# Patient Record
Sex: FEMALE | Race: BLACK OR AFRICAN AMERICAN | NOT HISPANIC OR LATINO | Employment: STUDENT | URBAN - METROPOLITAN AREA
[De-identification: names, ages, dates, MRNs, and addresses within clinical notes are randomized per-mention and may not be internally consistent; named-entity substitution may affect disease eponyms.]

---
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
## 2021-03-10 ENCOUNTER — OFFICE VISIT (OUTPATIENT)
Dept: INTERNAL MEDICINE | Facility: CLINIC | Age: 21
End: 2021-03-10
Payer: COMMERCIAL

## 2021-03-10 VITALS
DIASTOLIC BLOOD PRESSURE: 82 MMHG | WEIGHT: 176.6 LBS | TEMPERATURE: 98 F | SYSTOLIC BLOOD PRESSURE: 116 MMHG | RESPIRATION RATE: 16 BRPM | BODY MASS INDEX: 29.42 KG/M2 | HEIGHT: 65 IN | OXYGEN SATURATION: 100 % | HEART RATE: 77 BPM

## 2021-03-10 DIAGNOSIS — E55.9 VITAMIN D DEFICIENCY: Primary | ICD-10-CM

## 2021-03-10 PROCEDURE — 36415 COLL VENOUS BLD VENIPUNCTURE: CPT | Performed by: NURSE PRACTITIONER

## 2021-03-10 PROCEDURE — 99213 OFFICE O/P EST LOW 20 MIN: CPT | Performed by: NURSE PRACTITIONER

## 2021-03-10 PROCEDURE — 82306 VITAMIN D 25 HYDROXY: CPT | Performed by: NURSE PRACTITIONER

## 2021-03-10 ASSESSMENT — ENCOUNTER SYMPTOMS
WEAKNESS: 0
BREAST MASS: 0
DIARRHEA: 0
JOINT SWELLING: 0
CONSTIPATION: 0
FREQUENCY: 0
EYE PAIN: 0
HEMATURIA: 0
COUGH: 0
NERVOUS/ANXIOUS: 0
PALPITATIONS: 0
HEADACHES: 0
ARTHRALGIAS: 0
SORE THROAT: 0
FEVER: 0
DIZZINESS: 0
PARESTHESIAS: 0
HEARTBURN: 0
DYSURIA: 0
HEMATOCHEZIA: 0
SHORTNESS OF BREATH: 0
NAUSEA: 0
MYALGIAS: 0
ABDOMINAL PAIN: 0

## 2021-03-10 ASSESSMENT — PAIN SCALES - GENERAL: PAINLEVEL: NO PAIN (0)

## 2021-03-10 ASSESSMENT — MIFFLIN-ST. JEOR: SCORE: 1576.93

## 2021-03-10 NOTE — LETTER
March 15, 2021      [facility] TERRANCE Naranjo  [address]   [place] [id number]        Dear ,    We are writing to inform you of your test results.    Your recent lab results were normal.  Please continue to take Vitamin D 8303-0154 U daily.     Resulted Orders   Vitamin D Deficiency   Result Value Ref Range    Vitamin D Deficiency screening 28 20 - 75 ug/L      Comment:      Season, race, dietary intake, and treatment affect the concentration of   25-hydroxy-Vitamin D. Values may decrease during winter months and increase   during summer months. Values 20-29 ug/L may indicate Vitamin D insufficiency   and values <20 ug/L may indicate Vitamin D deficiency.  Vitamin D determination is routinely performed by an immunoassay specific for   25 hydroxyvitamin D3.  If an individual is on vitamin D2 (ergocalciferol)   supplementation, please specify 25 OH vitamin D2 and D3 level determination by   LCMSMS test [id number].         If you have any questions or concerns, please call the clinic at the number listed above.       Sincerely,      Ela Mendoza NP

## 2021-03-10 NOTE — PROGRESS NOTES
SUBJECTIVE:   CC: Gely Naranjo is an 20 year old woman who presents for preventive health visit.       Patient has been advised of split billing requirements and indicates understanding: Yes  Healthy Habits:     Getting at least 3 servings of Calcium per day:  NO    Bi-annual eye exam:  Yes    Dental care twice a year:  Yes    Sleep apnea or symptoms of sleep apnea:  Excessive snoring    Diet:  Regular (no restrictions)    Frequency of exercise:  1 day/week    Duration of exercise:  15-30 minutes    Taking medications regularly:  Yes    Medication side effects:  None    PHQ-2 Total Score: 0    Additional concerns today:  No          H/o Vitamin D def, taking 2000 U daily    Today's PHQ-2 Score:   PHQ-2 ( 1999 Pf[employer]) 3/10/2021   Q1: Little interest or pleasure in doing things 0   Q2: Feeling down, depressed or hopeless 0   PHQ-2 Score 0   Q1: Little interest or pleasure in doing things Not at all   Q2: Feeling down, depressed or hopeless Not at all   PHQ-2 Score 0       Abuse: Current or Past (Physical, Sexual or Emotional) - No  Do you feel safe in your environment? Yes    Have you ever done Advance Care Planning? (For example, a Health Directive, POLST, or a discussion with a medical provider or your loved ones about your wishes): No, advance care planning information given to patient to review.  Patient declined advance care planning discussion at this time.    Social History     Tobacco Use     Smoking status: Never Smoker     Smokeless tobacco: Never Used   Substance Use Topics     Alcohol use: No     If you drink alcohol do you typically have >3 drinks per day or >7 drinks per week? No    Alcohol Use 3/10/2021   Prescreen: >3 drinks/day or >7 drinks/week? Not Applicable   Prescreen: >3 drinks/day or >7 drinks/week? -         Reviewed orders with patient.  Reviewed health maintenance and updated orders accordingly - Yes  BP Readings from Last 3 Encounters:   03/10/21 116/82   12/01/20 119/81   08/27/20 103/72     Wt Readings from Last 3 Encounters:   03/10/21 80.1 kg (176 lb 9.6 oz) (94 %, Z= 1.55)*   12/01/20 86.4 kg (190 lb 6.4 oz) (96 %, Z= 1.81)*   08/27/20 85 kg (187 lb 6.4 oz) (96 %, Z= 1.77)*     * Growth percentiles are based on [facility] (Girls, 2-20 Years) data.                  Patient Active Problem List   Diagnosis     Vitamin D deficiency     History reviewed. No pertinent surgical history.    Social History     Tobacco Use     Smoking status: Never Smoker     Smokeless tobacco: Never Used   Substance Use Topics     Alcohol use: No     Family History   Problem Relation Age of Onset     Diabetes Maternal Grandmother      No Known Problems Mother      No Known Problems Father      Coronary Artery Disease No family hx of          Current Outpatient Medications   Medication Sig Dispense Refill     calcium carbonate-vitamin D 500-400 MG-UNIT TABS per tablet Take 1 tablet by mouth daily 180 tablet 3     dicyclomine (BENTYL) 10 MG capsule Take 1 capsule (10 mg) by mouth 4 times daily as needed 120 capsule 0     omeprazole (PRILOSEC) 20 MG DR capsule Take 1 capsule (20 mg) by mouth daily 90 capsule 0           History of abnormal Pap smear: NO - under age 21, PAP not appropriate for age     Reviewed and updated as needed this visit by clinical staff  Tobacco  Allergies  Meds   Med Hx  Surg Hx  Fam Hx  Soc Hx        Reviewed and updated as needed this visit by Provider                    Review of Systems   Constitutional: Negative for fever.   HENT: Positive for congestion. Negative for ear pain, hearing loss and sore throat.    Eyes: Negative for pain and visual disturbance.   Respiratory: Negative for cough and shortness of breath.    Cardiovascular: Negative for chest pain, palpitations and peripheral edema.   Gastrointestinal: Negative for abdominal pain, constipation, diarrhea, heartburn, hematochezia and nausea.   Breasts:  Negative for tenderness, breast mass and discharge.   Genitourinary: Negative for  "dysuria, frequency, genital sores, hematuria, pelvic pain, urgency, vaginal bleeding and vaginal discharge.   Musculoskeletal: Negative for arthralgias, joint swelling and myalgias.   Skin: Negative for rash.   Neurological: Negative for dizziness, weakness, headaches and paresthesias.   Psychiatric/Behavioral: Negative for mood changes. The patient is not nervous/anxious.           OBJECTIVE:   /82 (BP Location: Right arm, Patient Position: Sitting, Cuff Size: Adult Regular)   Pulse 77   Temp 98  F (36.7  C) (Oral)   Resp 16   Ht 1.651 m (5' 5\")   Wt 80.1 kg (176 lb 9.6 oz)   LMP 03/01/2021 (Exact Date)   SpO2 100%   BMI 29.39 kg/m    Physical Exam  GENERAL: healthy, alert and no distress  EYES: Eyes grossly normal to inspection, PERRL and conjunctivae and sclerae normal  RESP: lungs clear to auscultation - no rales, rhonchi or wheezes  CV: regular rate and rhythm, normal S1 S2, no S3 or S4, no murmur, click or rub, no peripheral edema and peripheral pulses strong  MS: no gross musculoskeletal defects noted, no edema  PSYCH: mentation appears normal, affect normal/bright        ASSESSMENT/PLAN:       ICD-10-CM    1. Vitamin D deficiency  E55.9 Vitamin D Deficiency       Patient has been advised of split billing requirements and indicates understanding: Yes  COUNSELING:  Reviewed preventive health counseling, as reflected in patient instructions    Estimated body mass index is 29.39 kg/m  as calculated from the following:    Height as of this encounter: 1.651 m (5' 5\").    Weight as of this encounter: 80.1 kg (176 lb 9.6 oz).      She reports that she has never smoked. She has never used smokeless tobacco.      Counseling Resources:  ATP IV Guidelines  Pooled Cohorts Equation Calculator  Breast Cancer Risk Calculator  BRCA-Related Cancer Risk Assessment: FHS-7 Tool  FRAX Risk Assessment  ICSI Preventive Guidelines  Dietary Guidelines for Americans, 2010  USDA's MyPlate  ASA Prophylaxis  Lung CA " Screening    Ela Mendoza NP  [facility]

## 2021-03-10 NOTE — NURSING NOTE
"Patient here for a physical.  Vital signs:  Temp: 98  F (36.7  C) Temp src: Oral BP: 116/82 Pulse: 77   Resp: 16 SpO2: 100 %     Height: 165.1 cm (5' 5\") Weight: 80.1 kg (176 lb 9.6 oz)  Estimated body mass index is 29.39 kg/m  as calculated from the following:    Height as of this encounter: 1.651 m (5' 5\").    Weight as of this encounter: 80.1 kg (176 lb 9.6 oz).          "

## 2021-03-11 LAB — DEPRECATED CALCIDIOL+CALCIFEROL SERPL-MC: 28 UG/L (ref 20–75)

## 2021-04-04 ENCOUNTER — HEALTH MAINTENANCE LETTER (OUTPATIENT)
Age: 21
End: 2021-04-04

## 2021-08-27 ENCOUNTER — IMMUNIZATION (OUTPATIENT)
Dept: NURSING | Facility: CLINIC | Age: 21
End: 2021-08-27
Payer: COMMERCIAL

## 2021-08-27 PROCEDURE — 91300 PR COVID VAC PFIZER DIL RECON 30 MCG/0.3 ML IM: CPT

## 2021-08-27 PROCEDURE — 0001A PR COVID VAC PFIZER DIL RECON 30 MCG/0.3 ML IM: CPT

## 2021-09-19 ENCOUNTER — HEALTH MAINTENANCE LETTER (OUTPATIENT)
Age: 21
End: 2021-09-19

## 2022-05-01 ENCOUNTER — HEALTH MAINTENANCE LETTER (OUTPATIENT)
Age: 22
End: 2022-05-01

## 2022-06-29 ENCOUNTER — OFFICE VISIT (OUTPATIENT)
Dept: INTERNAL MEDICINE | Facility: CLINIC | Age: 22
End: 2022-06-29
Payer: COMMERCIAL

## 2022-06-29 VITALS
HEIGHT: 65 IN | WEIGHT: 191.8 LBS | TEMPERATURE: 98.2 F | DIASTOLIC BLOOD PRESSURE: 78 MMHG | SYSTOLIC BLOOD PRESSURE: 96 MMHG | HEART RATE: 117 BPM | BODY MASS INDEX: 31.96 KG/M2

## 2022-06-29 DIAGNOSIS — F90.9 ATTENTION DEFICIT HYPERACTIVITY DISORDER (ADHD), UNSPECIFIED ADHD TYPE: Primary | ICD-10-CM

## 2022-06-29 PROCEDURE — 99213 OFFICE O/P EST LOW 20 MIN: CPT | Performed by: NURSE PRACTITIONER

## 2022-06-29 RX ORDER — ATOMOXETINE 10 MG/1
10 CAPSULE ORAL DAILY
Qty: 90 CAPSULE | Refills: 0 | Status: SHIPPED | OUTPATIENT
Start: 2022-06-29 | End: 2023-08-14

## 2022-06-29 NOTE — PROGRESS NOTES
"  Assessment & Plan     Attention deficit hyperactivity disorder (ADHD), unspecified ADHD type    - atomoxetine (STRATTERA) 10 MG capsule; Take 1 capsule (10 mg) by mouth daily  - Adult Mental Health  Referral; Future    F/u mental health on med and dose  Establish PCP         BMI:   Estimated body mass index is 31.92 kg/m  as calculated from the following:    Height as of this encounter: 1.651 m (5' 5\").    Weight as of this encounter: 87 kg (191 lb 12.8 oz).           Ela Mendoza NP  [facility] Hong is a 21 year old, presenting for the following health issues:  Consult (ADHD)    Had ADHD evaluation done [facility] psychologist, brings documentation of diagnosis ADHD  No meds, but now interested due to lack of focus, concentration interferring with school/work  H/o insomnia, strattera suggested.             Review of Systems   Constitutional, HEENT, cardiovascular, pulmonary, gi and gu systems are negative, except as otherwise noted.      Objective    Pulse 117   Temp 98.2  F (36.8  C) (Oral)   Ht 1.651 m (5' 5\")   Wt 87 kg (191 lb 12.8 oz)   BMI 31.92 kg/m    Body mass index is 31.92 kg/m .  Physical Exam   GENERAL: healthy, alert and no distress  PSYCH: mentation appears normal, affect flat and soft spoken                    .  ..  "

## 2022-11-14 ENCOUNTER — E-VISIT (OUTPATIENT)
Dept: URGENT CARE | Facility: CLINIC | Age: 22
End: 2022-11-14
Payer: COMMERCIAL

## 2022-11-14 DIAGNOSIS — B96.89 ACUTE BACTERIAL SINUSITIS: Primary | ICD-10-CM

## 2022-11-14 DIAGNOSIS — J01.90 ACUTE BACTERIAL SINUSITIS: Primary | ICD-10-CM

## 2022-11-14 PROCEDURE — 99421 OL DIG E/M SVC 5-10 MIN: CPT | Performed by: FAMILY MEDICINE

## 2022-11-14 NOTE — PATIENT INSTRUCTIONS
Dear Gely Naranjo    After reviewing your responses, I've been able to diagnose you with?a sinus infection caused by bacteria.?     Based on your responses and diagnosis, I have prescribed Augmentin to treat your symptoms. I have sent this to your pharmacy.?     It is also important to stay well hydrated, get lots of rest and take over-the-counter decongestants,?tylenol?or ibuprofen if you?are able to?take those medications per your primary care provider to help relieve discomfort.?     It is important that you take?all of?your prescribed medication even if your symptoms are improving after a few doses.? Taking?all of?your medicine helps prevent the symptoms from returning.?     If your symptoms worsen, you develop severe headache, vomiting, high fever (>102), or are not improving in 7 days, please contact your primary care provider for an appointment or visit any of our convenient Walk-in Care or Urgent Care Centers to be seen which can be found on our website?here.?     Thanks again for choosing?us?as your health care partner,?   ?  Leora Finley MD?

## 2022-11-20 ENCOUNTER — HEALTH MAINTENANCE LETTER (OUTPATIENT)
Age: 22
End: 2022-11-20

## 2023-02-15 ENCOUNTER — E-VISIT (OUTPATIENT)
Dept: INTERNAL MEDICINE | Facility: CLINIC | Age: 23
End: 2023-02-15
Payer: COMMERCIAL

## 2023-02-15 DIAGNOSIS — N91.2 ABSENCE OF MENSTRUATION: Primary | ICD-10-CM

## 2023-02-15 PROCEDURE — 99421 OL DIG E/M SVC 5-10 MIN: CPT | Performed by: NURSE PRACTITIONER

## 2023-04-12 ENCOUNTER — E-VISIT (OUTPATIENT)
Dept: URGENT CARE | Facility: CLINIC | Age: 23
End: 2023-04-12
Payer: COMMERCIAL

## 2023-04-12 DIAGNOSIS — J06.9 VIRAL URI: Primary | ICD-10-CM

## 2023-04-12 PROCEDURE — 99421 OL DIG E/M SVC 5-10 MIN: CPT | Performed by: PREVENTIVE MEDICINE

## 2023-04-12 RX ORDER — IPRATROPIUM BROMIDE 42 UG/1
2 SPRAY, METERED NASAL 4 TIMES DAILY PRN
Qty: 15 ML | Refills: 0 | Status: SHIPPED | OUTPATIENT
Start: 2023-04-12 | End: 2023-08-14

## 2023-04-12 NOTE — TELEPHONE ENCOUNTER
Provider E-Visit time total (minutes): 3   [Joint Pain] : joint pain [Joint Stiffness] : joint stiffness [Negative] : Endocrine

## 2023-04-12 NOTE — PATIENT INSTRUCTIONS
Viral Upper Respiratory Illness (Adult)  You have a viral upper respiratory illness (URI), which is another term for the common cold. This viral illness is contagious during the first few days. It's spread through the air by coughing and sneezing. It may also be spread by direct contact (touching the sick person and then touching your own eyes, nose, or mouth). Frequent handwashing will lower risk of spread. Most viral illnesses go away within 7 to 10 days with rest and simple home remedies. Sometimes the illness may last for several weeks. Antibiotics will not kill a virus, and they are generally not prescribed for this condition.   Home care    If symptoms are severe, rest at home for the first 2 to 3 days or as advised. When you resume activity, don't let yourself get too tired.    Don't smoke. If you need help stopping, talk with your healthcare provider.    Stay away from cigarette smoke (yours or others ).    You may use acetaminophen or ibuprofen to control pain and fever, unless another medicine was prescribed. Talk with your provider before taking these medicines if you have chronic liver or kidney disease. Also talk with your provider if you've had a stomach ulcer or digestive bleeding, or you take blood-thinning medicines. Never give aspirin to anyone under 18 years of age who is ill with a viral infection or fever. It may cause severe liver or brain damage, or even death.    Your appetite may be poor, so a light diet is OK. Stay well hydrated by drinking 6 to 8 glasses of fluids per day (water, soft drinks, juices, tea, or soup). Extra fluids will help loosen secretions in the nose and lungs.    Over-the-counter cold medicines will not shorten the length of time you re sick. But they may be helpful for cough, sore throat, and nasal and sinus congestion. If you take prescription medicines, ask your healthcare provider or pharmacist which over-the-counter medicines are safe to use. Don't use  decongestants, if you have high blood pressure, without first talking with your healthcare provider.    If you are taking other prescribed medicine, contact your healthcare provider before taking any over-the-counter medicines.    Follow-up care  Follow up with your healthcare provider, or as advised.  When to seek medical advice  Call your healthcare provider right away if any of these occur:    Cough with lots of colored sputum (mucus)    Severe headache; face, neck, or ear pain    Difficulty swallowing due to throat pain    Fever of 100.4 F (38 C) or higher , or as directed by your healthcare provider  Call 911  Call 911 if any of these occur:     Chest pain, shortness of breath, wheezing, or difficulty breathing    Coughing up blood    Very severe pain with swallowing, especially if it goes along with a muffled voice    Feeling of doom    Feeling dizzy, faint, or confused    Lips or skin is blue, purple or gray in color  Jocelyn last reviewed this educational content on 1/1/2022 2000-2022 [employer]. All rights reserved. This information is not intended as a substitute for professional medical care. Always follow your healthcare professional's instructions.        Your symptoms are consistent with a viral upper respiratory infection.       I have prescribed atrovent to help with your nasal congestion.  I recommend tylenol for pain as needed  In addition netipot saline rinse 2 times per day can be helpful.  This is over the counter.    Follow up in 10-14 days in person in clinic if you are not improving.

## 2023-06-02 ENCOUNTER — HEALTH MAINTENANCE LETTER (OUTPATIENT)
Age: 23
End: 2023-06-02

## 2023-08-11 ENCOUNTER — APPOINTMENT (OUTPATIENT)
Dept: CT IMAGING | Facility: CLINIC | Age: 23
End: 2023-08-11
Attending: EMERGENCY MEDICINE
Payer: COMMERCIAL

## 2023-08-11 ENCOUNTER — HOSPITAL ENCOUNTER (EMERGENCY)
Facility: CLINIC | Age: 23
Discharge: HOME | End: 2023-08-11
Attending: EMERGENCY MEDICINE | Admitting: EMERGENCY MEDICINE
Payer: COMMERCIAL

## 2023-08-11 VITALS
BODY MASS INDEX: 29.27 KG/M2 | HEIGHT: 66 IN | DIASTOLIC BLOOD PRESSURE: 81 MMHG | RESPIRATION RATE: 18 BRPM | TEMPERATURE: 98.1 F | HEART RATE: 69 BPM | OXYGEN SATURATION: 98 % | SYSTOLIC BLOOD PRESSURE: 111 MMHG | WEIGHT: 182.1 LBS

## 2023-08-11 DIAGNOSIS — R55 SYNCOPE AND COLLAPSE: ICD-10-CM

## 2023-08-11 LAB
ALBUMIN UR-MCNC: 20 MG/DL
AMORPH CRY #/AREA URNS HPF: ABNORMAL /HPF
AMPHETAMINES UR QL SCN: NORMAL
ANION GAP SERPL CALCULATED.3IONS-SCNC: 10 MMOL/L (ref 7–15)
APPEARANCE UR: ABNORMAL
BARBITURATES UR QL SCN: NORMAL
BASOPHILS # BLD AUTO: 0 10E3/UL (ref 0–0.2)
BASOPHILS NFR BLD AUTO: 0 %
BENZODIAZ UR QL SCN: NORMAL
BILIRUB UR QL STRIP: NEGATIVE
BUN SERPL-MCNC: 9 MG/DL (ref 6–20)
BZE UR QL SCN: NORMAL
CALCIUM SERPL-MCNC: 9.3 MG/DL (ref 8.6–10)
CANNABINOIDS UR QL SCN: NORMAL
CHLORIDE SERPL-SCNC: 103 MMOL/L (ref 98–107)
COLOR UR AUTO: YELLOW
CREAT SERPL-MCNC: 0.55 MG/DL (ref 0.51–0.95)
DEPRECATED HCO3 PLAS-SCNC: 25 MMOL/L (ref 22–29)
EOSINOPHIL # BLD AUTO: 0 10E3/UL (ref 0–0.7)
EOSINOPHIL NFR BLD AUTO: 0 %
ERYTHROCYTE [DISTWIDTH] IN BLOOD BY AUTOMATED COUNT: 13.4 % (ref 10–15)
GFR SERPL CREATININE-BSD FRML MDRD: >90 ML/MIN/1.73M2
GLUCOSE SERPL-MCNC: 106 MG/DL (ref 70–99)
GLUCOSE UR STRIP-MCNC: NEGATIVE MG/DL
HCG SERPL QL: NEGATIVE
HCT VFR BLD AUTO: 39.9 % (ref 35–47)
HGB BLD-MCNC: 13.1 G/DL (ref 11.7–15.7)
HGB UR QL STRIP: NEGATIVE
HOLD SPECIMEN: NORMAL
IMM GRANULOCYTES # BLD: 0.1 10E3/UL
IMM GRANULOCYTES NFR BLD: 1 %
KETONES UR STRIP-MCNC: NEGATIVE MG/DL
LEUKOCYTE ESTERASE UR QL STRIP: NEGATIVE
LYMPHOCYTES # BLD AUTO: 0.9 10E3/UL (ref 0.8–5.3)
LYMPHOCYTES NFR BLD AUTO: 10 %
MCH RBC QN AUTO: 28.1 PG (ref 26.5–33)
MCHC RBC AUTO-ENTMCNC: 32.8 G/DL (ref 31.5–36.5)
MCV RBC AUTO: 85 FL (ref 78–100)
MONOCYTES # BLD AUTO: 0.5 10E3/UL (ref 0–1.3)
MONOCYTES NFR BLD AUTO: 5 %
MUCOUS THREADS #/AREA URNS LPF: PRESENT /LPF
NEUTROPHILS # BLD AUTO: 7.9 10E3/UL (ref 1.6–8.3)
NEUTROPHILS NFR BLD AUTO: 84 %
NITRATE UR QL: NEGATIVE
NRBC # BLD AUTO: 0 10E3/UL
NRBC BLD AUTO-RTO: 0 /100
OPIATES UR QL SCN: NORMAL
PH UR STRIP: 8 [PH] (ref 5–7)
PLATELET # BLD AUTO: 295 10E3/UL (ref 150–450)
POTASSIUM SERPL-SCNC: 3.8 MMOL/L (ref 3.4–5.3)
RBC # BLD AUTO: 4.67 10E6/UL (ref 3.8–5.2)
RBC URINE: 1 /HPF
SODIUM SERPL-SCNC: 138 MMOL/L (ref 136–145)
SP GR UR STRIP: 1.03 (ref 1–1.03)
TROPONIN T SERPL HS-MCNC: 16 NG/L
TROPONIN T SERPL HS-MCNC: 17 NG/L
UROBILINOGEN UR STRIP-MCNC: NORMAL MG/DL
WBC # BLD AUTO: 9.5 10E3/UL (ref 4–11)
WBC URINE: 0 /HPF

## 2023-08-11 PROCEDURE — 81001 URINALYSIS AUTO W/SCOPE: CPT | Mod: XU | Performed by: EMERGENCY MEDICINE

## 2023-08-11 PROCEDURE — 99285 EMERGENCY DEPT VISIT HI MDM: CPT | Mod: 25

## 2023-08-11 PROCEDURE — 84703 CHORIONIC GONADOTROPIN ASSAY: CPT | Performed by: EMERGENCY MEDICINE

## 2023-08-11 PROCEDURE — 84484 ASSAY OF TROPONIN QUANT: CPT | Performed by: EMERGENCY MEDICINE

## 2023-08-11 PROCEDURE — 85025 COMPLETE CBC W/AUTO DIFF WBC: CPT | Performed by: EMERGENCY MEDICINE

## 2023-08-11 PROCEDURE — 80048 BASIC METABOLIC PNL TOTAL CA: CPT | Performed by: EMERGENCY MEDICINE

## 2023-08-11 PROCEDURE — 93005 ELECTROCARDIOGRAM TRACING: CPT

## 2023-08-11 PROCEDURE — 82310 ASSAY OF CALCIUM: CPT | Performed by: EMERGENCY MEDICINE

## 2023-08-11 PROCEDURE — 80307 DRUG TEST PRSMV CHEM ANLYZR: CPT | Performed by: EMERGENCY MEDICINE

## 2023-08-11 PROCEDURE — 36415 COLL VENOUS BLD VENIPUNCTURE: CPT | Performed by: EMERGENCY MEDICINE

## 2023-08-11 PROCEDURE — 70450 CT HEAD/BRAIN W/O DYE: CPT

## 2023-08-11 ASSESSMENT — ACTIVITIES OF DAILY LIVING (ADL): ADLS_ACUITY_SCORE: 33

## 2023-08-11 NOTE — ED PROVIDER NOTES
History     Chief Complaint:  Syncope     The history is provided by the patient.      Gely Naranjo is a 22 year old female who presents to the ED with her sister for evaluation of syncope. The patient reports she was cleaning the countertop in her kitchen and is unaware what happened, but she remembers waking up on the floor. She believes she was unconscious for 40 minutes to an hour because she got a text around 1020, and then woke up and it was 1130. States nobody was at home to see her fall and she felt fine before 1000. When she woke up on the floor, she was confused, lightheaded, dizzy, and noticed that she bit the right side of her tongue. She then went to the couch and slept for a bit longer and still felt the same symptoms when she woke up from the nap, along with feeling nauseous and vomiting once. Patient mentions she had a similar episode in March when she was on the phone with her friend. During that syncopal episode, she was unconscious for the same amount of time and woke up with the same symptoms of feeling lightheaded and dizzy, along with having a bite carolee on her tongue. Endorses a headache in the back of her head when she moves her head around, but denies pain anywhere else. Patient did not take Tylenol or Ibuprofen at home. Her sister mentions that she hs recurrent sinus problems that don't go away, and the patient adds that her sinuses flare up and she has difficulty breathing when she goes to sleep. Denies fever, cold symptoms, urinary symptoms, recent antibiotics, recent travel, or being around anyone sick.    Independent Historian:   Sibling - They report as noted above.    Review of External Notes:   I reviewed a note from April 2023.    Medications:    Strattera  Prilosec  Prednisone     Past Medical History:    Vitamin D deficiency     Past Surgical History:    The patient has no pertinent past surgical history.     Physical Exam   Patient Vitals for the past 24 hrs:   BP Temp Temp src  "Pulse Resp SpO2 Height Weight   08/11/23 1341 119/67 98.1  F (36.7  C) Oral 68 18 98 % 1.676 m (5' 6\") 82.6 kg (182 lb 1.6 oz)      Physical Exam  General: The patient is alert, in no respiratory distress.    HENT: Mucous membranes moist. Right lateral tongue has abrasions.     Cardiovascular: Regular rate and rhythm. Good pulses in all four extremities. Normal capillary refill and skin turgor.     Respiratory: Lungs are clear. No nasal flaring. No retractions. No wheezing, no crackles.    Gastrointestinal: Abdomen soft. No guarding, no rebound. No palpable hernias.     Musculoskeletal: No gross deformity.     Skin: No rashes or petechiae.     Neurologic: The patient is alert and oriented x3. GCS 15. No testable cranial nerve deficit. Follows commands with clear and appropriate speech. Gives appropriate answers. Good strength in all extremities. No gross neurologic deficit. Gross sensation intact. Pupils are round and reactive. No meningismus.     Lymphatic: No cervical adenopathy. No lower extremity swelling.    Psychiatric: The patient is non-tearful.     Emergency Department Course   ECG  ECG taken at 1355, ECG read at 1634  Sinus rhythm with sinus arrhythmia with short MS   T wave inversion III  No significant change as compared to prior, dated 4/11/23.  Rate 65 bpm. MS interval 108 ms. QRS duration 92 ms. QT/QTc 392/407 ms. P-R-T axes 27 59 8.     Imaging:  Head CT w/o contrast   Final Result   IMPRESSION:   1.  No evidence of acute hemorrhage or other acute intracranial abnormality.   2.  No evidence of acute calvarial fracture.         Report per radiology    Laboratory:  Labs Ordered and Resulted from Time of ED Arrival to Time of ED Departure   BASIC METABOLIC PANEL - Abnormal       Result Value    Sodium 138      Potassium 3.8      Chloride 103      Carbon Dioxide (CO2) 25      Anion Gap 10      Urea Nitrogen 9.0      Creatinine 0.55      Calcium 9.3      Glucose 106 (*)     GFR Estimate >90     TROPONIN " T, HIGH SENSITIVITY - Abnormal    Troponin T, High Sensitivity 16 (*)    TROPONIN T, HIGH SENSITIVITY - Abnormal    Troponin T, High Sensitivity 17 (*)    ROUTINE UA WITH MICROSCOPIC - Abnormal    Color Urine Yellow      Appearance Urine Cloudy (*)     Glucose Urine Negative      Bilirubin Urine Negative      Ketones Urine Negative      Specific Gravity Urine 1.026      Blood Urine Negative      pH Urine 8.0 (*)     Protein Albumin Urine 20 (*)     Urobilinogen Urine Normal      Nitrite Urine Negative      Leukocyte Esterase Urine Negative      Mucus Urine Present (*)     Amorphous Crystals Urine Few (*)     RBC Urine 1      WBC Urine 0     HCG QUALITATIVE PREGNANCY - Normal    hCG Serum Qualitative Negative     CBC WITH PLATELETS AND DIFFERENTIAL    WBC Count 9.5      RBC Count 4.67      Hemoglobin 13.1      Hematocrit 39.9      MCV 85      MCH 28.1      MCHC 32.8      RDW 13.4      Platelet Count 295      % Neutrophils 84      % Lymphocytes 10      % Monocytes 5      % Eosinophils 0      % Basophils 0      % Immature Granulocytes 1      NRBCs per 100 WBC 0      Absolute Neutrophils 7.9      Absolute Lymphocytes 0.9      Absolute Monocytes 0.5      Absolute Eosinophils 0.0      Absolute Basophils 0.0      Absolute Immature Granulocytes 0.1      Absolute NRBCs 0.0     DRUG ABUSE SCREEN 1 URINE (ED)      Emergency Department Course & Assessments:  Independent Interpretation (X-rays, CTs, rhythm strip):  I independently reviewed the head CT and see no signs of obvious bleeding or trauma.     Assessments/Consultations/Discussion of Management or Tests:  ED Course as of 08/11/23 1750   Fri Aug 11, 2023   1641 I obtained history and examined the patient as noted above.    1740 I rechecked and updated the patient.    1744 I rechecked and updated the patient.      Social Determinants of Health affecting care:   None    Disposition:  The patient was discharged to home.     Impression & Plan    Medical Decision Making:  I do  not think the patient suffered any injury other than her tongue in the fall but I cannot exclude an injury to the head.  Also with consideration of a previous episode similar to this I did order head CT which thankfully did not show signs of bleeding or other structural abnormality.  By careful exam I do not think that there is a C-spine fracture or injury to the bony structures of the skull or extremities.  I look for electrolyte issues.  I considered PE but felt that it is unlikely and that she was completely asymptomatic prior to the episode and then passed out and woke up with confusion up to 30 to 40 minutes later.  This sounds very much like a seizure especially with the bite marks to her tongue.  I did consider that could be secondary to the fall but felt that is unlikely in its location.  The patient was questioned about pregnancy drug use denied this she has been appropriate here I did stressed need for outpatient follow-up and work-up I do not think she was hypoglycemic.  I think most likely this is a seizure and I put in a  referral for neurology but stressed she would need to follow-up with a primary care doctor as well symptoms to return for discussed.  I did give her seizure instructions to avoid activities such as swimming alone etc.    Diagnosis:    ICD-10-CM    1. Syncope and collapse  R55 Adult Neurology  Referral        Scribe Disclosure:  I, AMOR ALCOCER, am serving as a scribe at 4:40 PM on 8/11/2023 to document services personally performed by Lius Alfredo Hollis MD based on my observations and the provider's statements to me.     8/11/2023   Luis Alfredo Hollis MD Farnan, Christopher M, MD  08/11/23 [id number]

## 2023-08-11 NOTE — ED TRIAGE NOTES
Pt presents with syncopal episode at home at 1000 this AM. Was standing in kitchen and recalls waking up on the floor. Emesis X1. Patient bite tongue. Denies seizure history. Patient denies pain or injury,reports feeling tired. A&OX4.    Triage Assessment       Row Name 08/11/23 1342       Triage Assessment (Adult)    Airway WDL WDL       Respiratory WDL    Respiratory WDL WDL       Skin Circulation/Temperature WDL    Skin Circulation/Temperature WDL WDL       Cardiac WDL    Cardiac WDL WDL       Peripheral/Neurovascular WDL    Peripheral Neurovascular WDL WDL       Cognitive/Neuro/Behavioral WDL    Cognitive/Neuro/Behavioral WDL WDL

## 2023-08-11 NOTE — DISCHARGE INSTRUCTIONS
Discharge Instructions  First Time Seizure (Convulsion)    You have had a spell that may have been a seizure. Many other things can look like a seizure, including a fainting spell, a migraine, psychological issues, and other movement disorders. It can often be hard to know with certainty whether you had a seizure. Your evaluation today may not be complete and you may need further testing and evaluation. You need to follow-up with your regular doctor within 3 days. You will often need to be scheduled for an EEG to monitor your brain waves.    Of people who have a seizure, most never have another one. For that reason, anti-seizure medicines are not started in most cases after a first seizure. If you have risk factors for seizures, medication may be started after a first seizure. People are not usually kept in the hospital after a seizure.    During a seizure, there is abnormal and excessive electrical activity in the brain. This can cause changes in awareness, behavior, and abnormal shaking or jerking movements.  This activity usually lasts only a few seconds to minutes. The period following a seizure is called the postictal state. During this time, you may be confused, tired, and you may develop a throbbing headache. Some people develop a brief period of difficulty speaking or experience temporary vision loss, numbness, or weakness.    Return to the Emergency Department if:   You have another seizure.  You develop a fever over 101 degrees.  You feel much more ill, or develop new symptoms like severe headache.  You have severe nausea or vomiting.  You have trouble walking, seeing, or develop weakness or numbness in your arms or legs.     What can I do to help myself?  Do not drive until you have been rechecked by your doctor and have been told it is safe to drive.  If you have a seizure while driving you may cause a motor vehicle accident with injury or death to yourself or others.   Do not swim, climb ladders, or do  anything else that would be dangerous if you had another seizure or spell of loss of consciousness, until you are cleared by your doctor.    Check your state driving requirements for patients with seizures on the Epilepsy Foundation Website at www.epilepsyfoundation.org/resources/drivingandtravel.cfm.  Do not drink alcohol.  Drinking alcohol increases the risk of seizures and can interfere with the effect of anti-seizure medications.  Take any medication prescribed for you exactly as directed, at the right times, and at the right doses.    If you were given a prescription for medicine here today, be sure to read all of the information (including the package insert) that comes with your prescription.  This will include important information about the medicine, its side effects, and any warnings that you need to know about.  The pharmacist who fills the prescription can provide more information and answer questions you may have about the medicine.  If you have questions or concerns that the pharmacist cannot address, please call or return to the Emergency Department.     Opioid Medication Information    Pain medications are among the most commonly prescribed medicines, so we are including this information for all our patients. If you did not receive pain medication or get a prescription for pain medicine, you can ignore it.     You may have been given a prescription for an opioid (narcotic) pain medicine and/or have received a pain medicine while here in the Emergency Department. These medicines can make you drowsy or impaired. You must not drive, operate dangerous equipment, or engage in any other dangerous activities while taking these medications. If you drive while taking these medications, you could be arrested for DUI, or driving under the influence. Do not drink any alcohol while you are taking these medications.     Opioid pain medications can cause addiction. If you have a history of chemical dependency of  any type, you are at a higher risk of becoming addicted to pain medications.  Only take these prescribed medications to treat your pain when all other options have been tried. Take it for as short a time and as few doses as possible. Store your pain pills in a secure place, as they are frequently stolen and provide a dangerous opportunity for children or visitors in your house to start abusing these powerful medications. We will not replace any lost or stolen medicine.  As soon as your pain is better, you should flush all your remaining medication.     Many prescription pain medications contain Tylenol  (acetaminophen), including Vicodin , Tylenol #3 , Norco , Lortab , and Percocet .  You should not take any extra pills of Tylenol  if you are using these prescription medications or you can get very sick.  Do not ever take more than 3000 mg of acetaminophen in any 24 hour period.    All opioids tend to cause constipation. Drink plenty of water and eat foods that have a lot of fiber, such as fruits, vegetables, prune juice, apple juice and high fiber cereal.  Take a laxative if you don t move your bowels at least every other day. Miralax , Milk of Magnesia, Colace , or Senna  can be used to keep you regular.      Remember that you can always come back to the Emergency Department if you are not able to see your regular doctor in the amount of time listed above, if you get any new symptoms, or if there is anything that worries you.

## 2023-08-14 ENCOUNTER — OFFICE VISIT (OUTPATIENT)
Dept: FAMILY MEDICINE | Facility: CLINIC | Age: 23
End: 2023-08-14
Payer: COMMERCIAL

## 2023-08-14 VITALS
TEMPERATURE: 98.7 F | HEIGHT: 66 IN | BODY MASS INDEX: 29.54 KG/M2 | SYSTOLIC BLOOD PRESSURE: 108 MMHG | DIASTOLIC BLOOD PRESSURE: 68 MMHG | WEIGHT: 183.8 LBS | HEART RATE: 89 BPM | OXYGEN SATURATION: 100 % | RESPIRATION RATE: 18 BRPM

## 2023-08-14 DIAGNOSIS — R55 SYNCOPE, UNSPECIFIED SYNCOPE TYPE: Primary | ICD-10-CM

## 2023-08-14 DIAGNOSIS — R79.89 ELEVATED TROPONIN LEVEL: ICD-10-CM

## 2023-08-14 DIAGNOSIS — E55.9 VITAMIN D DEFICIENCY: ICD-10-CM

## 2023-08-14 LAB
ATRIAL RATE - MUSE: 65 BPM
DIASTOLIC BLOOD PRESSURE - MUSE: NORMAL MMHG
INTERPRETATION ECG - MUSE: NORMAL
P AXIS - MUSE: 27 DEGREES
PR INTERVAL - MUSE: 108 MS
QRS DURATION - MUSE: 92 MS
QT - MUSE: 392 MS
QTC - MUSE: 407 MS
R AXIS - MUSE: 59 DEGREES
SYSTOLIC BLOOD PRESSURE - MUSE: NORMAL MMHG
T AXIS - MUSE: 8 DEGREES
TROPONIN T SERPL HS-MCNC: 7 NG/L
TSH SERPL DL<=0.005 MIU/L-ACNC: 1.41 UIU/ML (ref 0.3–4.2)
VENTRICULAR RATE- MUSE: 65 BPM

## 2023-08-14 PROCEDURE — 36415 COLL VENOUS BLD VENIPUNCTURE: CPT | Performed by: FAMILY MEDICINE

## 2023-08-14 PROCEDURE — 84443 ASSAY THYROID STIM HORMONE: CPT | Performed by: FAMILY MEDICINE

## 2023-08-14 PROCEDURE — 82306 VITAMIN D 25 HYDROXY: CPT | Performed by: FAMILY MEDICINE

## 2023-08-14 PROCEDURE — 99214 OFFICE O/P EST MOD 30 MIN: CPT | Performed by: FAMILY MEDICINE

## 2023-08-14 PROCEDURE — 84484 ASSAY OF TROPONIN QUANT: CPT | Performed by: FAMILY MEDICINE

## 2023-08-14 NOTE — PATIENT INSTRUCTIONS
It would be best not to drive until discussing with neurology.     I will review your studies via [employer] when they are available. If you have any questions or concerns please let me know via [employer], or call the clinic at  [phone].

## 2023-08-14 NOTE — PROGRESS NOTES
"  Assessment & Plan   See after visit summary and result note for helpful information and advice given to patient.    Syncope, unspecified syncope type  - TSH with free T4 reflex  - Adult Neurology  Referral    Elevated troponin level  - Troponin T, High Sensitivity  - Troponin T, High Sensitivity    Vitamin D deficiency  - Vitamin D Deficiency  - vitamin D3 (CHOLECALCIFEROL) 50 mcg (2000 units) tablet  Dispense: 90 tablet; Refill: 3             MED REC REQUIRED  Post Medication Reconciliation Status: discharge medications reconciled and changed, per note/orders  BMI:   Estimated body mass index is 30.12 kg/m  as calculated from the following:    Height as of this encounter: 1.664 m (5' 5.5\").    Weight as of this encounter: 83.4 kg (183 lb 12.8 oz).           Anmol DO Nikky  [facility] is a 22 year old, presenting for the following health issues:  Emergency Department        8/14/2023     8:09 AM   Additional Questions   Roomed by Namita HAGAN       ED/UC Followup:    Facility:  [facility]  Date of visit: 8/11/2023  Reason for visit: syncope  Current Status: feeling better      Review of Systems   Patient is seen for follow-up after recent hospital management where she was evaluated for reported seizure activity.    We discussed her recent ER evaluation.     No shortness of breath or chest pain associated with her slight troponin elevation noted during recent hospital evaluation.  She was agreeable to rechecking this lab.  She was also agreeable to checking her thyroid function as further evaluation of her syncopal episodes, and rechecking her vitamin D level which has been low in the past.    Patient was not taking vitamin D supplement at time of exam.    At time of exam, patient has no acute physical or mental health concerns.        Objective    /68   Pulse 89   Temp 98.7  F (37.1  C) (Tympanic)   Resp 18   Ht 1.664 m (5' 5.5\")   Wt 83.4 kg (183 " lb 12.8 oz)   LMP 07/24/2023 (Exact Date)   SpO2 100%   BMI 30.12 kg/m    Body mass index is 30.12 kg/m .  Physical Exam   Vital signs reviewed.  Patient is in no acute appearing distress.  Breathing appears nonlabored.  Patient is alert and oriented ×3.  Patient is very pleasant, making good eye contact and responding with clear fluent speech.  Patient can get up and down from exam room chair and table without visible difficulty.    Heart: Heart rate is regular without murmur.    Lungs: Lungs are clear to auscultation with good airflow bilaterally.    Neuro: No focal deficits or abnormalities.    Skin/extremities: Warm and dry, with no lower leg edema.

## 2023-08-14 NOTE — PROGRESS NOTES
{PROVIDER CHARTING PREFERENCE:145444}    Andi Hong is a 22 year old, presenting for the following health issues:  No chief complaint on file.  {(!) Visit Details have not yet been documented.  Please enter Visit Details and then use this list to pull in documentation. (Optional):371638}    @[employer]@     {SUPERLIST (Optional):750348}  {additonal problems for provider to add (Optional):254850}      [unfilled]   {ROS COMP (Optional):808874}      Objective    LMP 07/24/2023 (Exact Date)   There is no height or weight on file to calculate BMI.  [unfilled]   {Exam List (Optional):421103}    {Diagnostic Test Results (Optional):806541}    {AMBULATORY ATTESTATION (Optional):163413}              {PROVIDER CHARTING PREFERENCE:042906}    Andi Hong is a 22 year old, presenting for the following health issues:  No chief complaint on file.  {(!) Visit Details have not yet been documented.  Please enter Visit Details and then use this list to pull in documentation. (Optional):649045}    @[employer]@     ED/ Followup:    Facility:  [facility]  Date of visit: 8/11/2023  Reason for visit: syncope  Current Status: ***  {additonal problems for provider to add (Optional):680918}      [unfilled]   {ROS COMP (Optional):194893}      Objective    LMP 07/24/2023 (Exact Date)   There is no height or weight on file to calculate BMI.  [unfilled]   {Exam List (Optional):629319}    {Diagnostic Test Results (Optional):206797}    {AMBULATORY ATTESTATION (Optional):102199}              {PROVIDER CHARTING PREFERENCE:547141}    Andi Hong is a 22 year old, presenting for the following health issues:  No chief complaint on file.  {(!) Visit Details have not yet been documented.  Please enter Visit Details and then use this list to pull in documentation. (Optional):934699}    HPI     {SUPERLIST (Optional):929797}  {additonal problems for provider to add (Optional):767729}      Review of Systems   {ROS COMP  (Optional):861643}      Objective    LMP 07/24/2023 (Exact Date)   There is no height or weight on file to calculate BMI.  Physical Exam   {Exam List (Optional):547080}    {Diagnostic Test Results (Optional):612371}    {AMBULATORY ATTESTATION (Optional):901815}

## 2023-08-15 LAB — DEPRECATED CALCIDIOL+CALCIFEROL SERPL-MC: 16 UG/L (ref 20–75)

## 2023-08-16 RX ORDER — CHOLECALCIFEROL (VITAMIN D3) 50 MCG
1 TABLET ORAL DAILY
Qty: 90 TABLET | Refills: 3 | Status: SHIPPED | OUTPATIENT
Start: 2023-08-16

## 2023-08-28 ENCOUNTER — APPOINTMENT (OUTPATIENT)
Dept: CT IMAGING | Facility: CLINIC | Age: 23
End: 2023-08-28
Attending: EMERGENCY MEDICINE
Payer: COMMERCIAL

## 2023-08-28 ENCOUNTER — HOSPITAL ENCOUNTER (EMERGENCY)
Facility: CLINIC | Age: 23
Discharge: HOME | End: 2023-08-28
Attending: EMERGENCY MEDICINE | Admitting: EMERGENCY MEDICINE
Payer: COMMERCIAL

## 2023-08-28 VITALS
OXYGEN SATURATION: 100 % | WEIGHT: 180 LBS | HEIGHT: 65 IN | RESPIRATION RATE: 28 BRPM | BODY MASS INDEX: 29.99 KG/M2 | HEART RATE: 86 BPM | SYSTOLIC BLOOD PRESSURE: 115 MMHG | DIASTOLIC BLOOD PRESSURE: 77 MMHG | TEMPERATURE: 97.8 F

## 2023-08-28 DIAGNOSIS — G40.909 RECURRENT SEIZURES (H): ICD-10-CM

## 2023-08-28 LAB
ANION GAP SERPL CALCULATED.3IONS-SCNC: 10 MMOL/L (ref 7–15)
BASOPHILS # BLD AUTO: 0 10E3/UL (ref 0–0.2)
BASOPHILS NFR BLD AUTO: 0 %
BUN SERPL-MCNC: 8.2 MG/DL (ref 6–20)
CALCIUM SERPL-MCNC: 9.1 MG/DL (ref 8.6–10)
CHLORIDE SERPL-SCNC: 105 MMOL/L (ref 98–107)
CREAT SERPL-MCNC: 0.54 MG/DL (ref 0.51–0.95)
DEPRECATED HCO3 PLAS-SCNC: 26 MMOL/L (ref 22–29)
EOSINOPHIL # BLD AUTO: 0.1 10E3/UL (ref 0–0.7)
EOSINOPHIL NFR BLD AUTO: 2 %
ERYTHROCYTE [DISTWIDTH] IN BLOOD BY AUTOMATED COUNT: 13.2 % (ref 10–15)
GFR SERPL CREATININE-BSD FRML MDRD: >90 ML/MIN/1.73M2
GLUCOSE SERPL-MCNC: 96 MG/DL (ref 70–99)
HCG SERPL QL: NEGATIVE
HCT VFR BLD AUTO: 39.3 % (ref 35–47)
HGB BLD-MCNC: 12.9 G/DL (ref 11.7–15.7)
IMM GRANULOCYTES # BLD: 0 10E3/UL
IMM GRANULOCYTES NFR BLD: 0 %
LYMPHOCYTES # BLD AUTO: 1.7 10E3/UL (ref 0.8–5.3)
LYMPHOCYTES NFR BLD AUTO: 26 %
MCH RBC QN AUTO: 28.4 PG (ref 26.5–33)
MCHC RBC AUTO-ENTMCNC: 32.8 G/DL (ref 31.5–36.5)
MCV RBC AUTO: 86 FL (ref 78–100)
MONOCYTES # BLD AUTO: 0.5 10E3/UL (ref 0–1.3)
MONOCYTES NFR BLD AUTO: 7 %
NEUTROPHILS # BLD AUTO: 4.1 10E3/UL (ref 1.6–8.3)
NEUTROPHILS NFR BLD AUTO: 65 %
NRBC # BLD AUTO: 0 10E3/UL
NRBC BLD AUTO-RTO: 0 /100
PLATELET # BLD AUTO: 274 10E3/UL (ref 150–450)
POTASSIUM SERPL-SCNC: 3.8 MMOL/L (ref 3.4–5.3)
RBC # BLD AUTO: 4.55 10E6/UL (ref 3.8–5.2)
SODIUM SERPL-SCNC: 141 MMOL/L (ref 136–145)
WBC # BLD AUTO: 6.4 10E3/UL (ref 4–11)

## 2023-08-28 PROCEDURE — 99285 EMERGENCY DEPT VISIT HI MDM: CPT | Mod: 25

## 2023-08-28 PROCEDURE — 80048 BASIC METABOLIC PNL TOTAL CA: CPT | Performed by: EMERGENCY MEDICINE

## 2023-08-28 PROCEDURE — 85025 COMPLETE CBC W/AUTO DIFF WBC: CPT | Performed by: EMERGENCY MEDICINE

## 2023-08-28 PROCEDURE — 70450 CT HEAD/BRAIN W/O DYE: CPT

## 2023-08-28 PROCEDURE — 258N000003 HC RX IP 258 OP 636: Performed by: EMERGENCY MEDICINE

## 2023-08-28 PROCEDURE — 84703 CHORIONIC GONADOTROPIN ASSAY: CPT | Performed by: EMERGENCY MEDICINE

## 2023-08-28 PROCEDURE — 96365 THER/PROPH/DIAG IV INF INIT: CPT

## 2023-08-28 PROCEDURE — 250N000011 HC RX IP 250 OP 636: Performed by: EMERGENCY MEDICINE

## 2023-08-28 PROCEDURE — 93005 ELECTROCARDIOGRAM TRACING: CPT

## 2023-08-28 PROCEDURE — 36415 COLL VENOUS BLD VENIPUNCTURE: CPT | Performed by: EMERGENCY MEDICINE

## 2023-08-28 RX ORDER — LEVETIRACETAM 500 MG/1
500 TABLET ORAL 2 TIMES DAILY
Qty: 120 TABLET | Refills: 0 | Status: SHIPPED | OUTPATIENT
Start: 2023-08-28 | End: 2023-11-23

## 2023-08-28 RX ADMIN — LEVETIRACETAM 2000 MG: 100 INJECTION INTRAVENOUS at 14:58

## 2023-08-28 ASSESSMENT — ACTIVITIES OF DAILY LIVING (ADL)
ADLS_ACUITY_SCORE: 35
ADLS_ACUITY_SCORE: 35
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
## 2023-08-28 NOTE — DISCHARGE INSTRUCTIONS
You cannot drive, work at heights, swim/bathe alone until cleared by neurology.    Discharge Instructions  Recurrent Seizure (Convulsion)    You were seen today for a seizure. The most common reason for a recurrent seizure is having missed a dose of your medication or taken it at a different time than normal. Other things that increase the risk of seizures include fever, sleep deprivation, alcohol, and stress. Although anti-seizure medications (anti-epileptic drugs) work for many people with seizure disorders, some people continue to have seizures even after trying several medications.    Generally, every Emergency Department visit should have a follow-up clinic visit with either a primary or a specialty clinic/provider. Please follow-up as instructed by your emergency provider today.    Return to the Emergency Department if:   You develop a fever over 100.4 F.  You feel much more ill, or develop new symptoms like severe headache.  You have trouble walking, seeing, or develop weakness or numbness in your arms or legs.     What can I do to help myself?  Take your medication exactly as directed, at the right times, and the right doses.   If you develop uncomfortable side effects, do not stop taking your anti-seizure medication without first speaking to your provider.   Do not let your prescription run out. Stopping anti-seizure medication abruptly can put you at risk of a seizure.  While taking an anti-seizure medication, do not start taking any other medications including over-the-counter medications and herbal supplements without first checking with your provider because mixing them can be dangerous.  Do not drive until you have been rechecked by your provider and have been told it is safe to drive.  If you have a seizure while driving you may cause a motor vehicle accident with injury or death to yourself or others.   Do not swim, climb ladders, or do anything else that would be dangerous if you had another seizure  or spell of loss of consciousness, until you are cleared by your provider.    Check your state driving requirements for patients with seizures on the Epilepsy Foundation Website at www.epilepsyfoundation.org/resources/drivingandtravel.cfm.  Do not drink alcohol.  Drinking alcohol increases the risk of seizures and can interfere with the effect of anti-seizure medications.  Start a seizure calendar to record any seizure triggers, such as days when you were sleep-deprived, stressed, drank alcohol, or (if you are a woman) had your period.  Remember, if one medication does not work for you, either because you cannot tolerate the side effects or because you continue to have seizures, your provider can suggest alternate medications or alternate methods of taking the medication.  If you were given a prescription for medicine here today, be sure to read all of the information (including the package insert) that comes with your prescription.  This will include important information about the medicine, its side effects, and any warnings that you need to know about.  The pharmacist who fills the prescription can provide more information and answer questions you may have about the medicine.  If you have questions or concerns that the pharmacist cannot address, please call or return to the Emergency Department.   Remember that you can always come back to the Emergency Department if you are not able to see your regular provider in the amount of time listed above, if you get any new symptoms, or if there is anything that worries you.

## 2023-08-28 NOTE — ED TRIAGE NOTES
Triage Assessment       Row Name 08/28/23 1255       Triage Assessment (Adult)    Airway WDL WDL       Respiratory WDL    Respiratory WDL WDL       Skin Circulation/Temperature WDL    Skin Circulation/Temperature WDL WDL       Cardiac WDL    Cardiac WDL WDL       Peripheral/Neurovascular WDL    Peripheral Neurovascular WDL WDL       Cognitive/Neuro/Behavioral WDL    Cognitive/Neuro/Behavioral WDL WDL
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
## 2023-08-29 ENCOUNTER — OFFICE VISIT (OUTPATIENT)
Dept: FAMILY MEDICINE | Facility: CLINIC | Age: 23
End: 2023-08-29
Payer: COMMERCIAL

## 2023-08-29 VITALS
BODY MASS INDEX: 29.8 KG/M2 | RESPIRATION RATE: 19 BRPM | DIASTOLIC BLOOD PRESSURE: 76 MMHG | SYSTOLIC BLOOD PRESSURE: 117 MMHG | HEIGHT: 66 IN | HEART RATE: 86 BPM | WEIGHT: 185.4 LBS | OXYGEN SATURATION: 100 % | TEMPERATURE: 98.2 F

## 2023-08-29 DIAGNOSIS — G40.909 SEIZURE DISORDER (H): Primary | ICD-10-CM

## 2023-08-29 LAB
ATRIAL RATE - MUSE: 86 BPM
DIASTOLIC BLOOD PRESSURE - MUSE: NORMAL MMHG
INTERPRETATION ECG - MUSE: NORMAL
P AXIS - MUSE: 52 DEGREES
PR INTERVAL - MUSE: 124 MS
QRS DURATION - MUSE: 78 MS
QT - MUSE: 360 MS
QTC - MUSE: 430 MS
R AXIS - MUSE: 31 DEGREES
SYSTOLIC BLOOD PRESSURE - MUSE: NORMAL MMHG
T AXIS - MUSE: 4 DEGREES
VENTRICULAR RATE- MUSE: 86 BPM

## 2023-08-29 PROCEDURE — 99213 OFFICE O/P EST LOW 20 MIN: CPT

## 2023-08-29 ASSESSMENT — PAIN SCALES - GENERAL: PAINLEVEL: MILD PAIN (2)

## 2023-08-29 NOTE — PATIENT INSTRUCTIONS
[facility] ([place]): [phone]    [facility] ([place]): [phone]
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
## 2023-08-30 ENCOUNTER — PATIENT OUTREACH (OUTPATIENT)
Dept: CARE COORDINATION | Facility: CLINIC | Age: 23
End: 2023-08-30
Payer: COMMERCIAL

## 2023-08-30 NOTE — PROGRESS NOTES
[facility] Resource Center Contact  [facility]/Voicemail     Clinical Data: Care Coordination ED-sourced Outreach-     Outreach attempted x 2.  Left message on patient's voicemail, providing [facility]'s 24/7 scheduling and nurse triage phone number [phone] ([phone]) for questions/concerns and/or to schedule an appt with an [facility] provider.      Care Coordination introduction letter with explanation of Clinic Care Coordination services sent to patient via [employer]. Clinic Care Coordination services remain available via referral if needed.    Plan: [facility] will do no further outreaches at this time.       Naomie Lyons  Community Health Worker  [facility] Care Resource [place], [facility]    *Connected Care Resource Team does NOT follow patient ongoing. Referrals are identified based on internal discharge reports and the outreach is to ensure patient has an understanding of their discharge instructions.

## 2023-08-30 NOTE — LETTER
Gely Naranjo  [address]   [place] [address]    Dear Gely Narnajo,      I am a team member within the Connected Care Resource Center with [facility]. I recently tried to reach you to ensure you were doing well following a recent visit within our health system. I also wanted to take this chance to introduce Clinic Care Coordination.     Below is a description of Clinic Care Coordination and how this team can further assist you:       The Clinic Care Coordination team is made up of a Registered Nurse, , Financial Resource Worker, and a Community Health Worker who understand and can help navigate the health care system. The goal of clinic care coordination is to help you manage your health, improve access to care, and achieve optimal health outcomes. They work alongside your provider to assist you in determining your health and social needs, obtain health care and community resources, and provide you with necessary information and education. Clinic Care Coordination can work with you through any barriers and develop a care plan that helps coordinate and strengthen the relationship between you and your care team.    If you wish to connect with the Clinic Care Coordination Team, please let your [facility] Primary Care Provider or Clinic Care Team know and they can place a referral. The Clinic Care Coordination team will then reach out by phone to further support you.    We are focused on providing you with the highest-quality healthcare experience possible.    Sincerely,   Your care team with [facility]

## 2023-11-23 ENCOUNTER — APPOINTMENT (OUTPATIENT)
Dept: CT IMAGING | Facility: CLINIC | Age: 23
End: 2023-11-23
Attending: EMERGENCY MEDICINE
Payer: COMMERCIAL

## 2023-11-23 ENCOUNTER — HOSPITAL ENCOUNTER (EMERGENCY)
Facility: CLINIC | Age: 23
Discharge: HOME | End: 2023-11-23
Attending: EMERGENCY MEDICINE | Admitting: EMERGENCY MEDICINE
Payer: COMMERCIAL

## 2023-11-23 VITALS
DIASTOLIC BLOOD PRESSURE: 58 MMHG | RESPIRATION RATE: 18 BRPM | TEMPERATURE: 97.6 F | HEART RATE: 85 BPM | SYSTOLIC BLOOD PRESSURE: 94 MMHG | OXYGEN SATURATION: 100 %

## 2023-11-23 DIAGNOSIS — G40.909 RECURRENT SEIZURES (H): ICD-10-CM

## 2023-11-23 LAB
ANION GAP SERPL CALCULATED.3IONS-SCNC: 9 MMOL/L (ref 7–15)
BASOPHILS # BLD AUTO: 0 10E3/UL (ref 0–0.2)
BASOPHILS NFR BLD AUTO: 0 %
BUN SERPL-MCNC: 13.7 MG/DL (ref 6–20)
CALCIUM SERPL-MCNC: 8.9 MG/DL (ref 8.6–10)
CHLORIDE SERPL-SCNC: 100 MMOL/L (ref 98–107)
CREAT SERPL-MCNC: 0.56 MG/DL (ref 0.51–0.95)
DEPRECATED HCO3 PLAS-SCNC: 27 MMOL/L (ref 22–29)
EGFRCR SERPLBLD CKD-EPI 2021: >90 ML/MIN/1.73M2
EOSINOPHIL # BLD AUTO: 0.1 10E3/UL (ref 0–0.7)
EOSINOPHIL NFR BLD AUTO: 1 %
ERYTHROCYTE [DISTWIDTH] IN BLOOD BY AUTOMATED COUNT: 13 % (ref 10–15)
GLUCOSE SERPL-MCNC: 110 MG/DL (ref 70–99)
HCT VFR BLD AUTO: 39.2 % (ref 35–47)
HGB BLD-MCNC: 13.1 G/DL (ref 11.7–15.7)
IMM GRANULOCYTES # BLD: 0 10E3/UL
IMM GRANULOCYTES NFR BLD: 0 %
LEVETIRACETAM SERPL-MCNC: 4.4 ΜG/ML (ref 10–40)
LYMPHOCYTES # BLD AUTO: 1.4 10E3/UL (ref 0.8–5.3)
LYMPHOCYTES NFR BLD AUTO: 14 %
MAGNESIUM SERPL-MCNC: 1.7 MG/DL (ref 1.7–2.3)
MCH RBC QN AUTO: 28.7 PG (ref 26.5–33)
MCHC RBC AUTO-ENTMCNC: 33.4 G/DL (ref 31.5–36.5)
MCV RBC AUTO: 86 FL (ref 78–100)
MONOCYTES # BLD AUTO: 0.5 10E3/UL (ref 0–1.3)
MONOCYTES NFR BLD AUTO: 5 %
NEUTROPHILS # BLD AUTO: 7.9 10E3/UL (ref 1.6–8.3)
NEUTROPHILS NFR BLD AUTO: 80 %
NRBC # BLD AUTO: 0 10E3/UL
NRBC BLD AUTO-RTO: 0 /100
PLATELET # BLD AUTO: 288 10E3/UL (ref 150–450)
POTASSIUM SERPL-SCNC: 3.7 MMOL/L (ref 3.4–5.3)
RBC # BLD AUTO: 4.57 10E6/UL (ref 3.8–5.2)
SODIUM SERPL-SCNC: 136 MMOL/L (ref 135–145)
WBC # BLD AUTO: 10 10E3/UL (ref 4–11)

## 2023-11-23 PROCEDURE — 250N000011 HC RX IP 250 OP 636: Mod: JZ | Performed by: EMERGENCY MEDICINE

## 2023-11-23 PROCEDURE — 258N000003 HC RX IP 258 OP 636: Performed by: EMERGENCY MEDICINE

## 2023-11-23 PROCEDURE — 80048 BASIC METABOLIC PNL TOTAL CA: CPT | Performed by: EMERGENCY MEDICINE

## 2023-11-23 PROCEDURE — 83735 ASSAY OF MAGNESIUM: CPT | Performed by: EMERGENCY MEDICINE

## 2023-11-23 PROCEDURE — 85025 COMPLETE CBC W/AUTO DIFF WBC: CPT | Performed by: EMERGENCY MEDICINE

## 2023-11-23 PROCEDURE — 96365 THER/PROPH/DIAG IV INF INIT: CPT

## 2023-11-23 PROCEDURE — 99285 EMERGENCY DEPT VISIT HI MDM: CPT | Mod: 25

## 2023-11-23 PROCEDURE — 93005 ELECTROCARDIOGRAM TRACING: CPT

## 2023-11-23 PROCEDURE — 80177 DRUG SCRN QUAN LEVETIRACETAM: CPT | Performed by: EMERGENCY MEDICINE

## 2023-11-23 PROCEDURE — 36415 COLL VENOUS BLD VENIPUNCTURE: CPT | Performed by: EMERGENCY MEDICINE

## 2023-11-23 PROCEDURE — 70450 CT HEAD/BRAIN W/O DYE: CPT

## 2023-11-23 RX ORDER — LEVETIRACETAM 500 MG/1
1000 TABLET ORAL 2 TIMES DAILY
Qty: 120 TABLET | Refills: 0 | Status: SHIPPED | OUTPATIENT
Start: 2023-11-23

## 2023-11-23 RX ADMIN — LEVETIRACETAM 1682 MG: 100 INJECTION, SOLUTION INTRAVENOUS at 01:00

## 2023-11-23 ASSESSMENT — ACTIVITIES OF DAILY LIVING (ADL): ADLS_ACUITY_SCORE: 35

## 2023-11-23 NOTE — ED TRIAGE NOTES
Pt has hx of seizures. Pt had a seizure at 2000 and again at 2300. pt hit her head on the floor in the bathroom when she had a seizure at 2300

## 2023-11-23 NOTE — RESULT ENCOUNTER NOTE
"Low level.  Per ED provider note, \" Anti-epileptic levels were sent and they will discuss the level with the neurologist.  She was loaded with Keppra here in the emergency department.  I discussed the case with our on-call neurologist who recommended increasing her Keppra to 1000 mg twice daily and have her follow-up closely with her primary neurologist."

## 2023-11-23 NOTE — ED PROVIDER NOTES
History     Chief Complaint:  Seizures and Head Injury       HPI   Gely Naranjo is a 23 year old female with a history of seizures who presents to the ED with seizures and a head injury. Patient explains that tonight she was in bed at 2000 when she had a seizure. She then got up to go to the bathroom at 2300 when she had her second seizure. She fell to the bathroom floor and hit her head. Patient reports that the feeling following was similar to her previous 3 seizures. Mom and sister report that she has full body shaking when she has seizures. Patient says she bite her mouth during the seizure and threw up after the second one. She says she is currently feeling tired as well as has a headache. Patient says she had a normal day otherwise and has not been feeling overtired or overstressed. She has been eating and drinking as normal. Denies urinary incontinence, pain elsewhere, or recent illness. Denies missing any doses of her Keppra which she takes 500 mg of twice daily.     Independent Historian:   Mom and sister supplement as above.    Review of External Notes:   None     Medications:    Keppra  Cholecalciferol   Omeprazole     Past Medical History:    Vitamin D deficiency   Seizures     Physical Exam   Patient Vitals for the past 24 hrs:   BP Temp Temp src Pulse Resp SpO2   11/23/23 0116 -- -- -- -- -- 99 %   11/23/23 0115 -- -- -- -- -- 100 %   11/23/23 0114 -- -- -- -- -- 100 %   11/23/23 0113 -- -- -- -- -- 97 %   11/23/23 0111 -- -- -- -- -- 99 %   11/23/23 0107 -- -- -- 76 -- --   11/23/23 0100 106/70 -- -- 77 18 99 %   11/23/23 0012 123/81 97.6  F (36.4  C) Temporal 87 18 99 %      Physical Exam  General: Patient is alert, awake and interactive when I enter the room  Head: Hematoma to the forehead without overlying laceration.  Eyes: The pupils are equal, round, and reactive to light. Conjunctivae and sclerae are normal  ENT: Evidence of bruising to the lateral borders of the tongue bilaterally.   "Consistent with tongue biting.  No hemotympanum or signs basilar skull fracture. The oropharynx is normal without erythema. No tenderness to palpation of the face, nose or jaw.   Neck: Normal range of motion. No cervical midline tenderness.   CV: Regular rate. S1/S2. No murmurs.   Resp: Lungs are clear without wheezes or rales. No distress. No crepitance.   GI: Abdomen is soft, no rigidity, guarding, or rebound. No contusion. No distension. No tenderness to palpation in any quadrant.     MS: Normal tone. Joints grossly normal without effusions. No asymmetric leg swelling, calf or thigh tenderness. Normal motor assessment of all extremities. PROM performed of all major joints without pain. No C/T/L tenderness in midline  Skin: No rash or lesions noted. Normal capillary refill noted  Neuro:  GCS 15.  CN\"s II-XII intact. Speech is normal and fluent. Face is symmetric. Strength is normal and symmetric.   Psych: Normal affect.  Appropriate interactions.    Emergency Department Course   ECG  ECG results from 11/23/23   EKG 12-lead, tracing only     Value    Systolic Blood Pressure     Diastolic Blood Pressure     Ventricular Rate 74    Atrial Rate 74    TN Interval 120    QRS Duration 86        QTc 415    P Axis 33    R AXIS 56    T Axis 15    Interpretation ECG      Sinus rhythm  Normal ECG  When compared with ECG of 28-AUG-2023 14:29,  No significant change was found        Imaging:  CT Head w/o Contrast   Final Result   IMPRESSION:   1.  Normal head CT.            Laboratory:  Labs Ordered and Resulted from Time of ED Arrival to Time of ED Departure   BASIC METABOLIC PANEL - Abnormal       Result Value    Sodium 136      Potassium 3.7      Chloride 100      Carbon Dioxide (CO2) 27      Anion Gap 9      Urea Nitrogen 13.7      Creatinine 0.56      GFR Estimate >90      Calcium 8.9      Glucose 110 (*)    MAGNESIUM - Normal    Magnesium 1.7     CBC WITH PLATELETS AND DIFFERENTIAL    WBC Count 10.0      RBC Count " 4.57      Hemoglobin 13.1      Hematocrit 39.2      MCV 86      MCH 28.7      MCHC 33.4      RDW 13.0      Platelet Count 288      % Neutrophils 80      % Lymphocytes 14      % Monocytes 5      % Eosinophils 1      % Basophils 0      % Immature Granulocytes 0      NRBCs per 100 WBC 0      Absolute Neutrophils 7.9      Absolute Lymphocytes 1.4      Absolute Monocytes 0.5      Absolute Eosinophils 0.1      Absolute Basophils 0.0      Absolute Immature Granulocytes 0.0      Absolute NRBCs 0.0     KEPPRA (LEVETIRACETAM) LEVEL        Procedures   None    Emergency Department Course & Assessments:       Interventions:  Medications   levETIRAcetam (KEPPRA) 1,682 mg in sodium chloride 0.9 % 100 mL intermittent infusion (1,682 mg Intravenous $New Bag 11/23/23 0100)     Assessments:  0020 I obtained history and examined the patient as noted above.     Consultations/Discussion of Management or Tests:  I spoke with on-call general neurology who recommends increasing Keppra to 1000 mg twice daily    Social Determinants of Health affecting care:   None    Disposition:  The patient was discharged to home.     Impression & Plan    CMS Diagnoses: None    Medical Decision Making:  Gely Naranjo is a 23 year old female who presents for evaluation of 2 seizure-like events at home.  In review of the patient's past medical history she has been seen in our emergency department twice for seizure-like activity.  She is followed closely with neurology and is currently undergoing workup.  She is maintained on 500 mg of Keppra twice daily for seizures.  According to her she had an EEG that was negative has not had an MRI.  She still maintain seizure precautions.  Tonight it sounds like she had one seizure while lying in bed and one seizure in the bathroom that resulted in some head trauma.  Fortunately the CT of her head today was negative for any evidence of intracranial hemorrhage or skull fracture.  There is no other injuries on her head to  toe exam. A broad differential diagnosis was considered for the seizure today including medicine non-compliance, low or high levels of anti-epileptic, intracranial bleed, stroke, tumor, hypoglycemia, cerebral edema, metabolic derangement, cardiac arrhythmia, etc.  The patient has no signs of concerning etiologies of seizure or seizure-mimics at this time.  Anti-epileptic levels were sent and they will discuss the level with the neurologist.  She was loaded with Keppra here in the emergency department.  I discussed the case with our on-call neurologist who recommended increasing her Keppra to 1000 mg twice daily and have her follow-up closely with her primary neurologist.  I discussed this with the patient and her family and they are amenable at this time.  Supportive outpatient management is indicated.      Diagnosis:    ICD-10-CM    1. Recurrent seizures (H)  G40.909             Scribe Disclosure:  Zoey KING, am serving as a scribe at 12:25 AM on 11/23/2023 to document services personally performed by Luis Alfredo Lund MD based on my observations and the provider's statements to me.     11/23/2023   Luis Alfredo Lund MD Battista, Christopher Joseph, MD  11/23/23 0206

## 2023-11-24 LAB
ATRIAL RATE - MUSE: 74 BPM
DIASTOLIC BLOOD PRESSURE - MUSE: NORMAL MMHG
INTERPRETATION ECG - MUSE: NORMAL
P AXIS - MUSE: 33 DEGREES
PR INTERVAL - MUSE: 120 MS
QRS DURATION - MUSE: 86 MS
QT - MUSE: 374 MS
QTC - MUSE: 415 MS
R AXIS - MUSE: 56 DEGREES
SYSTOLIC BLOOD PRESSURE - MUSE: NORMAL MMHG
T AXIS - MUSE: 15 DEGREES
VENTRICULAR RATE- MUSE: 74 BPM

## 2023-11-30 ENCOUNTER — VIRTUAL VISIT (OUTPATIENT)
Dept: INTERNAL MEDICINE | Facility: CLINIC | Age: 23
End: 2023-11-30
Payer: COMMERCIAL

## 2023-11-30 DIAGNOSIS — E55.9 VITAMIN D DEFICIENCY: ICD-10-CM

## 2023-11-30 DIAGNOSIS — G40.909 SEIZURE DISORDER (H): Primary | ICD-10-CM

## 2023-11-30 PROCEDURE — 99213 OFFICE O/P EST LOW 20 MIN: CPT | Mod: 95 | Performed by: INTERNAL MEDICINE

## 2023-11-30 NOTE — PROGRESS NOTES
"Gely is a 23 year old who is being evaluated via a billable video visit.      How would you like to obtain your AVS? MyChart  If the video visit is dropped, the invitation should be resent by: Send to e-mail at: [email or web address]  Will anyone else be joining your video visit? No          Assessment & Plan     Seizure disorder (H)  Overall, symptoms have been stable since ED visit.  Patient has been taking the increased dose of Keppra medication.  Pending MRI as per neurology recommendations with [facility].  Patient had questions about subtherapeutic levels with the Keppra medication and we discussed as to how intake of other medications at the same time with the antiseizure medication can impact absorption of the Keppra.  Patient does endorse taking the vitamin D medication at the same time.  With this discussed on spacing out the medications.  Keppra level lab work pending.  - Keppra (Levetiracetam) Level; Future    Vitamin D deficiency  Continue vitamin D supplementation for vitamin D deficiency.  Recommendations on medication intake were discussed with the patient as mentioned above.             BMI:   Estimated body mass index is 30.38 kg/m  as calculated from the following:    Height as of 8/29/23: 1.664 m (5' 5.5\").    Weight as of 8/29/23: 84.1 kg (185 lb 6.4 oz).           Julia Cornejo MD  [facility]    Subjective   Gely is a 23 year old, presenting for the following health issues:  Referral (Patient has history of seizures. Patient has had an EEG and is requesting an order for an MRI.)      HPI     Patient comes in today for follow-up on seizure concerns.  New onset seizures diagnosed in March 2023.  Has undergone an EEG with [place] [facility].  Follows up with neurology at [facility].  Has a pending MRI.          Review of Systems   Constitutional, HEENT, cardiovascular, pulmonary, gi and gu systems are negative, except as otherwise noted.   "    Objective           Vitals:  No vitals were obtained today due to virtual visit.    Physical Exam   GENERAL: Healthy, alert and no distress  EYES: Eyes grossly normal to inspection.  No discharge or erythema, or obvious scleral/conjunctival abnormalities.  RESP: No audible wheeze, cough, or visible cyanosis.  No visible retractions or increased work of breathing.    SKIN: Visible skin clear. No significant rash, abnormal pigmentation or lesions.  NEURO: Cranial nerves grossly intact.  Mentation and speech appropriate for age.  PSYCH: Mentation appears normal, affect normal/bright, judgement and insight intact, normal speech and appearance well-groomed.                Video-Visit Details    Type of service:  Video Visit     Originating Location (pt. Location): Home    Distant Location (provider location):  On-site  Platform [facility] for Video Visit[facility]

## 2023-12-18 ENCOUNTER — LAB (OUTPATIENT)
Dept: LAB | Facility: CLINIC | Age: 23
End: 2023-12-18
Payer: COMMERCIAL

## 2023-12-18 DIAGNOSIS — G40.909 SEIZURE DISORDER (H): ICD-10-CM

## 2023-12-18 LAB — LEVETIRACETAM SERPL-MCNC: 33 ΜG/ML (ref 10–40)

## 2023-12-18 PROCEDURE — 36415 COLL VENOUS BLD VENIPUNCTURE: CPT

## 2023-12-18 PROCEDURE — 80177 DRUG SCRN QUAN LEVETIRACETAM: CPT

## 2024-01-30 ENCOUNTER — TELEPHONE (OUTPATIENT)
Dept: FAMILY MEDICINE | Facility: CLINIC | Age: 24
End: 2024-01-30
Payer: COMMERCIAL

## 2024-01-30 NOTE — TELEPHONE ENCOUNTER
Patient Quality Outreach    Patient is due for the following:   Physical Preventive Adult Physical  Chlamydia Screening    Next Steps:   Schedule a Adult Preventative    Type of outreach:    Sent letter.      Questions for provider review:               Geraldine Sarah CMA

## 2024-02-28 ENCOUNTER — TELEPHONE (OUTPATIENT)
Dept: FAMILY MEDICINE | Facility: CLINIC | Age: 24
End: 2024-02-28
Payer: COMMERCIAL

## 2024-02-28 NOTE — TELEPHONE ENCOUNTER
Patient Quality Outreach    Patient is due for the following:   Cervical Cancer Screening - PAP Needed  Physical Preventive Adult Physical    Next Steps:   Schedule a Adult Preventative    Type of outreach:    Sent letter.      Questions for provider review:               Geraldine Sarah CMA

## 2024-06-22 ENCOUNTER — HEALTH MAINTENANCE LETTER (OUTPATIENT)
Age: 24
End: 2024-06-22

## 2024-07-12 ENCOUNTER — TELEPHONE (OUTPATIENT)
Dept: FAMILY MEDICINE | Facility: CLINIC | Age: 24
End: 2024-07-12
Payer: COMMERCIAL

## 2024-07-12 NOTE — TELEPHONE ENCOUNTER
Summary:    Patient is due/failing the following:   PAP    Reviewed:    [] CARE EVERYWHERE  [] LAST OV NOTE   [] FYI TAB  [] MYCHART ACTIVE?  [] LAST PANEL ENCOUNTER  [] FUTURE APPTS  [] IMMUNIZATIONS  [] Media Tab            Action needed:   Patient needs office visit for pap.    Type of outreach:    Sent App.nethart message.                                                                               Jessica Rasmussen/RONALDO  [facility]

## 2024-08-02 ENCOUNTER — VIRTUAL VISIT (OUTPATIENT)
Dept: URGENT CARE | Facility: CLINIC | Age: 24
End: 2024-08-02
Payer: COMMERCIAL

## 2024-08-02 DIAGNOSIS — N93.9 VAGINAL BLEEDING: Primary | ICD-10-CM

## 2024-08-02 PROCEDURE — 99212 OFFICE O/P EST SF 10 MIN: CPT | Mod: 95

## 2024-08-02 NOTE — PROGRESS NOTES
Assessment & Plan     Vaginal bleeding  Discussed that her hormone levels were altered by taking emergency contraceptive.  It is expected to have breakthrough bleeding.  I recommend follow-up to have a Pap smear and she is due for this.  If bleeding is persistent, discussed this with her provider.  If bleeding becomes heavy and protected, be seen right away for further evaluation.    Return in about 2 weeks (around 8/16/2024) for visit with PCP for pap.     CINDY Laguerre [facility]    Subjective   Gely Naranjo is a 23 year old who presents for the following health issues    HPI    Gely presents via video for evaluation of vaginal bleeding.  She states that she has always had a fairly regular cycle.  She began with a cycle on June 18.  Shortly after, she got  and became sexually active for the first time.  Around June 26, she used an emergency contraceptive pill.  About 2 weeks later, around July a 3 night, she used this contraceptive pill again.  She began spotting on July 9 and states that the bleeding lasted, likely, for about 6 days.  She then began bleeding again on July 26.  She states this still feels like spotting, and is lighter than usual.  She states that she generally gets cramps, reduced appetite and feels pressure in her lower abdomen with her.  And does not feel those symptoms this time.  She is still bleeding today, 7 days later.    Review of Systems   ROS negative except as stated above.      Objective    Physical Exam   GENERAL: Healthy, alert and no distress  EYES: Eyes grossly normal to inspection.  No discharge or erythema, or obvious scleral/conjunctival abnormalities.  HENT: Normal cephalic/atraumatic.  External ears, nose and mouth without ulcers or lesions.  No nasal drainage visible.  RESP: No audible cough wheeze or visible cyanosis.  No visible retractions or increased work of breathing.    SKIN: Visible skin clear. No significant rash, abnormal  pigmentation or lesions.  NEURO: Cranial nerves grossly intact.  Mentation and speech appropriate for age.  PSYCH: Mentation appears normal, affect normal/bright, judgement and insight intact, normal speech and appearance well-groomed.    Type of service:  Video Visit  Video Start Time: 9:34AM  Video End Time: 9:44AM  Originating Location (pt. Location): Home  Distant Location (provider location):  [facility] URGENT CARE- offsite at [facility]  Platform used for Video Visit: [email or web address]    No results found for any visits on 08/02/24.

## 2024-08-30 DIAGNOSIS — Z79.899 ENCOUNTER FOR LONG-TERM (CURRENT) USE OF MEDICATIONS: Primary | ICD-10-CM

## 2024-08-30 DIAGNOSIS — G40.909 EPILEPSY, UNSPECIFIED, NOT INTRACTABLE, WITHOUT STATUS EPILEPTICUS (H): ICD-10-CM

## 2024-10-31 DIAGNOSIS — G40.901 NONINTRACTABLE EPILEPSY WITH STATUS EPILEPTICUS, UNSPECIFIED EPILEPSY TYPE (H): Primary | ICD-10-CM

## 2024-11-13 ENCOUNTER — TRANSFERRED RECORDS (OUTPATIENT)
Dept: HEALTH INFORMATION MANAGEMENT | Facility: CLINIC | Age: 24
End: 2024-11-13

## 2024-11-13 ENCOUNTER — MYC MEDICAL ADVICE (OUTPATIENT)
Dept: INTERNAL MEDICINE | Facility: CLINIC | Age: 24
End: 2024-11-13

## 2024-11-13 ENCOUNTER — LAB (OUTPATIENT)
Dept: LAB | Facility: CLINIC | Age: 24
End: 2024-11-13
Payer: COMMERCIAL

## 2024-11-13 DIAGNOSIS — G40.909 EPILEPSY, UNSPECIFIED, NOT INTRACTABLE, WITHOUT STATUS EPILEPTICUS (H): ICD-10-CM

## 2024-11-13 DIAGNOSIS — Z79.899 ENCOUNTER FOR LONG-TERM (CURRENT) USE OF MEDICATIONS: ICD-10-CM

## 2024-11-13 LAB
HOLD SPECIMEN: NORMAL
LEVETIRACETAM SERPL-MCNC: 6.6 ÂΜG/ML (ref 10–40)
VIT D+METAB SERPL-MCNC: 15 NG/ML (ref 20–50)

## 2024-11-13 PROCEDURE — 80177 DRUG SCRN QUAN LEVETIRACETAM: CPT

## 2024-11-13 PROCEDURE — 82306 VITAMIN D 25 HYDROXY: CPT

## 2024-11-13 PROCEDURE — 36415 COLL VENOUS BLD VENIPUNCTURE: CPT

## 2024-11-19 ENCOUNTER — LAB REQUISITION (OUTPATIENT)
Dept: LAB | Facility: CLINIC | Age: 24
End: 2024-11-19

## 2024-11-19 ENCOUNTER — TRANSCRIBE ORDERS (OUTPATIENT)
Dept: MATERNAL FETAL MEDICINE | Facility: CLINIC | Age: 24
End: 2024-11-19
Payer: COMMERCIAL

## 2024-11-19 DIAGNOSIS — Z34.91 ENCOUNTER FOR SUPERVISION OF NORMAL PREGNANCY, UNSPECIFIED, FIRST TRIMESTER: ICD-10-CM

## 2024-11-19 DIAGNOSIS — O26.90 PREGNANCY RELATED CONDITION, ANTEPARTUM: Primary | ICD-10-CM

## 2024-11-19 LAB
ABO/RH(D): NORMAL
ANTIBODY SCREEN: NEGATIVE
BASOPHILS # BLD AUTO: 0 10E3/UL (ref 0–0.2)
BASOPHILS NFR BLD AUTO: 0 %
EOSINOPHIL # BLD AUTO: 0.1 10E3/UL (ref 0–0.7)
EOSINOPHIL NFR BLD AUTO: 1 %
ERYTHROCYTE [DISTWIDTH] IN BLOOD BY AUTOMATED COUNT: 12.9 % (ref 10–15)
EST. AVERAGE GLUCOSE BLD GHB EST-MCNC: 100 MG/DL
HBA1C MFR BLD: 5.1 %
HBV SURFACE AG SERPL QL IA: NONREACTIVE
HCT VFR BLD AUTO: 38.7 % (ref 35–47)
HCV AB SERPL QL IA: NONREACTIVE
HGB BLD-MCNC: 12.8 G/DL (ref 11.7–15.7)
HIV 1+2 AB+HIV1 P24 AG SERPL QL IA: NONREACTIVE
IMM GRANULOCYTES # BLD: 0 10E3/UL
IMM GRANULOCYTES NFR BLD: 1 %
LYMPHOCYTES # BLD AUTO: 1.7 10E3/UL (ref 0.8–5.3)
LYMPHOCYTES NFR BLD AUTO: 23 %
MCH RBC QN AUTO: 28.4 PG (ref 26.5–33)
MCHC RBC AUTO-ENTMCNC: 33.1 G/DL (ref 31.5–36.5)
MCV RBC AUTO: 86 FL (ref 78–100)
MONOCYTES # BLD AUTO: 0.6 10E3/UL (ref 0–1.3)
MONOCYTES NFR BLD AUTO: 7 %
NEUTROPHILS # BLD AUTO: 5.1 10E3/UL (ref 1.6–8.3)
NEUTROPHILS NFR BLD AUTO: 68 %
NRBC # BLD AUTO: 0 10E3/UL
NRBC BLD AUTO-RTO: 0 /100
PLATELET # BLD AUTO: 286 10E3/UL (ref 150–450)
RBC # BLD AUTO: 4.51 10E6/UL (ref 3.8–5.2)
RUBV IGG SERPL QL IA: 1.35 INDEX
RUBV IGG SERPL QL IA: POSITIVE
SPECIMEN EXPIRATION DATE: NORMAL
T PALLIDUM AB SER QL: NONREACTIVE
WBC # BLD AUTO: 7.5 10E3/UL (ref 4–11)

## 2024-11-19 PROCEDURE — 86850 RBC ANTIBODY SCREEN: CPT | Performed by: ADVANCED PRACTICE MIDWIFE

## 2024-11-19 PROCEDURE — 87340 HEPATITIS B SURFACE AG IA: CPT | Performed by: ADVANCED PRACTICE MIDWIFE

## 2024-11-19 PROCEDURE — 87389 HIV-1 AG W/HIV-1&-2 AB AG IA: CPT | Performed by: ADVANCED PRACTICE MIDWIFE

## 2024-11-19 PROCEDURE — 85004 AUTOMATED DIFF WBC COUNT: CPT | Performed by: ADVANCED PRACTICE MIDWIFE

## 2024-11-19 PROCEDURE — 86762 RUBELLA ANTIBODY: CPT | Performed by: ADVANCED PRACTICE MIDWIFE

## 2024-11-19 PROCEDURE — 85014 HEMATOCRIT: CPT | Performed by: ADVANCED PRACTICE MIDWIFE

## 2024-11-19 PROCEDURE — 83036 HEMOGLOBIN GLYCOSYLATED A1C: CPT | Performed by: ADVANCED PRACTICE MIDWIFE

## 2024-11-19 PROCEDURE — 86780 TREPONEMA PALLIDUM: CPT | Performed by: ADVANCED PRACTICE MIDWIFE

## 2024-11-19 PROCEDURE — 86900 BLOOD TYPING SEROLOGIC ABO: CPT | Performed by: ADVANCED PRACTICE MIDWIFE

## 2024-11-19 PROCEDURE — 86803 HEPATITIS C AB TEST: CPT | Performed by: ADVANCED PRACTICE MIDWIFE

## 2024-11-20 DIAGNOSIS — O99.350 SEIZURE DISORDER IN PREGNANCY (H): Primary | ICD-10-CM

## 2024-11-20 DIAGNOSIS — G40.909 SEIZURE DISORDER IN PREGNANCY (H): Primary | ICD-10-CM

## 2024-12-07 ENCOUNTER — HOSPITAL ENCOUNTER (EMERGENCY)
Facility: CLINIC | Age: 24
Discharge: HOME | End: 2024-12-07
Attending: STUDENT IN AN ORGANIZED HEALTH CARE EDUCATION/TRAINING PROGRAM | Admitting: STUDENT IN AN ORGANIZED HEALTH CARE EDUCATION/TRAINING PROGRAM
Payer: COMMERCIAL

## 2024-12-07 VITALS
OXYGEN SATURATION: 100 % | WEIGHT: 193.56 LBS | BODY MASS INDEX: 31.11 KG/M2 | DIASTOLIC BLOOD PRESSURE: 75 MMHG | RESPIRATION RATE: 22 BRPM | SYSTOLIC BLOOD PRESSURE: 111 MMHG | HEIGHT: 66 IN | HEART RATE: 79 BPM | TEMPERATURE: 97.4 F

## 2024-12-07 DIAGNOSIS — R56.9 SEIZURE (H): ICD-10-CM

## 2024-12-07 PROCEDURE — 99283 EMERGENCY DEPT VISIT LOW MDM: CPT

## 2024-12-07 PROCEDURE — 250N000013 HC RX MED GY IP 250 OP 250 PS 637: Performed by: STUDENT IN AN ORGANIZED HEALTH CARE EDUCATION/TRAINING PROGRAM

## 2024-12-07 PROCEDURE — 250N000011 HC RX IP 250 OP 636: Performed by: STUDENT IN AN ORGANIZED HEALTH CARE EDUCATION/TRAINING PROGRAM

## 2024-12-07 RX ORDER — LEVETIRACETAM 500 MG/1
1000 TABLET ORAL ONCE
Status: COMPLETED | OUTPATIENT
Start: 2024-12-07 | End: 2024-12-07

## 2024-12-07 RX ORDER — ONDANSETRON 4 MG/1
4 TABLET, ORALLY DISINTEGRATING ORAL ONCE
Status: COMPLETED | OUTPATIENT
Start: 2024-12-07 | End: 2024-12-07

## 2024-12-07 RX ADMIN — ONDANSETRON 4 MG: 4 TABLET, ORALLY DISINTEGRATING ORAL at 20:47

## 2024-12-07 RX ADMIN — LEVETIRACETAM 1000 MG: 500 TABLET, FILM COATED ORAL at 20:47

## 2024-12-07 ASSESSMENT — COLUMBIA-SUICIDE SEVERITY RATING SCALE - C-SSRS
6. HAVE YOU EVER DONE ANYTHING, STARTED TO DO ANYTHING, OR PREPARED TO DO ANYTHING TO END YOUR LIFE?: NO
1. IN THE PAST MONTH, HAVE YOU WISHED YOU WERE DEAD OR WISHED YOU COULD GO TO SLEEP AND NOT WAKE UP?: NO
2. HAVE YOU ACTUALLY HAD ANY THOUGHTS OF KILLING YOURSELF IN THE PAST MONTH?: NO

## 2024-12-07 ASSESSMENT — ACTIVITIES OF DAILY LIVING (ADL)
ADLS_ACUITY_SCORE: 41

## 2024-12-08 NOTE — ED PROVIDER NOTES
"  Emergency Department Note      History of Present Illness     Chief Complaint   Seizures      HPI   Gely Naranjo is a  24 year old female who is approximately 11 weeks pregnant with a history of epilepsy here for evaluation of a seizure. Patient reports having a seizure around 1645 today. Denies aura. Patient bit her tongue. States that last event she remember is taking a shower. States she might have fallen when getting out of the shower as she now complains of mild left sided back pain. Her  found her in her bedroom at 1650 and states she was crying. She was confused and unable to tell him was was wrong. She states she missed her morning dose of Keppra 1000 mg. She normally takes 1000 mg bid. She took the dose later at 1730. States she is normally compliant with her Keppra, her last seizure was about one year ago. States she feels tired now but no other symptoms were reported. Patient denies vaginal bleeding or abdominal cramping. Denies fever, vomiting, abdominal pain.     Independent Historian   None    Review of External Notes   I reviewed the note from 24- this is patient's 1st intrauterine pregnancy, patient has epilepsy     Past Medical History     Medical History and Problem List   Epilepsy     Medications   Keppra      Physical Exam     Patient Vitals for the past 24 hrs:   BP Temp Temp src Pulse Resp SpO2 Height Weight   24 2145 -- -- -- 79 22 100 % -- --   24 2100 111/75 -- -- 96 18 97 % -- --   24 2045 90/70 -- -- 96 20 99 % -- --   24 195 (!) 131/100 97.4  F (36.3  C) Temporal 83 18 100 % 1.676 m (5' 6\") 87.8 kg (193 lb 9 oz)     Physical Exam  GENERAL: Patient well-appearing.   HEAD: Atraumatic.  EYES: Anicteric. PERRL  NOSE: No active bleeding  MOUTH: Moist mucosa  THROAT: Patent airway.   NECK: No rigidity  CV: RRR, no murmurs, rubs or gallops  Torso: mild left-sided flank pain without skin changes or crepitus.  PULM: CTAB with good aeration; no retractions, " rales, rhonchi, or wheezing  ABD: Soft, nontender, nondistended, no guarding, no peritoneal signs   DERM: No rash. Skin warm and dry  EXTREMITY: Moving all extremities without difficulty. No calf tenderness or peripheral edema  NEURO: Alert and answering questions appropriately. CN 2-12 fully intact. Strength 5/5 bilateral LE/UE. Sensation fully intact to light touch symmetrically bilateral LE/UE. Normal finger-to-nose and heel to shin. No nystagmus.       Diagnostics     Lab Results   Labs Ordered and Resulted from Time of ED Arrival to Time of ED Departure - No data to display    Imaging   No orders to display          Independent Interpretation   None    ED Course      Medications Administered   Medications   levETIRAcetam (KEPPRA) tablet 1,000 mg (1,000 mg Oral $Given 12/7/24 2047)   ondansetron (ZOFRAN ODT) ODT tab 4 mg (4 mg Oral $Given 12/7/24 2047)       Procedures   Procedures     Discussion of Management   None    ED Course   ED Course as of 12/07/24 2204   Sat Dec 07, 2024   2021 I obtained history and examined the patient as noted above.        Additional Documentation  None    Medical Decision Making / Diagnosis     CMS Diagnoses: None    MIPS       None    MDM   Gely Naranjo is a 24 year old female     Patient presents after seizure.     Chronic conditions complicating -epilepsy, 11 weeks pregnant.    DDx considered meningitis, encephalitis, electrolyte disturbance, poisoning, bleed, however evaluation not consistent with these etiologies.    Patient has a known history of epilepsy and she missed her dose of Keppra, which could trigger a seizure.  She is mentating appropriately and is improved now, therefore do not think we need labs or imaging.  She felt nauseous and had 1 episode of vomiting here.  Afterward she felt better.  She states she typically does get nauseous with her seizures.  Given Zofran.  Tolerated p.o.  I gave her 1000 mg of Keppra so now she has received her full dose for the day.  She  can return to normal dosing tomorrow.  Bedside fetal ultrasound showing fetal heart rate 162 and single IUP.    Patient feeling well to go home.  Repeat vital signs are reassuring.    I have evaluated the patient for acute medical emergencies and have clinically decided no further acute medical interventions are required. Patient stable for discharge. All questions answered. Given strict return precautions. Patient content with plan. The differential diagnosis and treatment modalities were discussed thoroughly with the patient.  Written seizure precautions in discharge instructions.      Disposition   The patient was discharged.     Diagnosis     ICD-10-CM    1. Seizure (H)  R56.9            Discharge Medications   New Prescriptions    No medications on file         Scribe Disclosure:  I, Anabell Palafox, am serving as a scribe at 8:26 PM on 12/7/2024 to document services personally performed by Javan Coyle MD based on my observations and the provider's statements to me.        Javan Coyle MD  12/07/24 3441

## 2024-12-08 NOTE — DISCHARGE INSTRUCTIONS
Continue home losartan   Return to the Emergency Department if your symptoms worsen, become concerning, or for any other concerns. Follow-up with your doctor in 2-3 days.    No swimming, driving, operating heavy machinery or doing any activity that would put your in danger if you were to have a seizure.     Follow-up with neurology.      Discharge Instructions  Recurrent Seizure (Convulsion)    You were seen today for a seizure. The most common reason for a recurrent seizure is having missed a dose of your medication or taken it at a different time than normal. Other things that increase the risk of seizures include fever, sleep deprivation, alcohol, and stress. Although anti-seizure medications (anti-epileptic drugs) work for many people with seizure disorders, some people continue to have seizures even after trying several medications.    Generally, every Emergency Department visit should have a follow-up clinic visit with either a primary or a specialty clinic/provider. Please follow-up as instructed by your emergency provider today.    Return to the Emergency Department if:   You develop a fever over 100.4 F.  You feel much more ill, or develop new symptoms like severe headache.  You have trouble walking, seeing, or develop weakness or numbness in your arms or legs.     What can I do to help myself?  Take your medication exactly as directed, at the right times, and the right doses.   If you develop uncomfortable side effects, do not stop taking your anti-seizure medication without first speaking to your provider.   Do not let your prescription run out. Stopping anti-seizure medication abruptly can put you at risk of a seizure.  While taking an anti-seizure medication, do not start taking any other medications including over-the-counter medications and herbal supplements without first checking with your provider because mixing them can be dangerous.  Do not drive until you have been rechecked by your provider and have been told it is  safe to drive.  If you have a seizure while driving you may cause a motor vehicle accident with injury or death to yourself or others.   Do not swim, climb ladders, or do anything else that would be dangerous if you had another seizure or spell of loss of consciousness, until you are cleared by your provider.    Check your state driving requirements for patients with seizures on the Epile[employer] Foundation Website at https://www.epilepsy.com/lifestyle/driving-and-transportation/laws.  Do not drink alcohol.  Drinking alcohol increases the risk of seizures and can interfere with the effect of anti-seizure medications.  Start a seizure calendar to record any seizure triggers, such as days when you were sleep-deprived, stressed, drank alcohol, or (if you are a woman) had your period.  Remember, if one medication does not work for you, either because you cannot tolerate the side effects or because you continue to have seizures, your provider can suggest alternate medications or alternate methods of taking the medication.  If you were given a prescription for medicine here today, be sure to read all of the information (including the package insert) that comes with your prescription.  This will include important information about the medicine, its side effects, and any warnings that you need to know about.  The pharmacist who fills the prescription can provide more information and answer questions you may have about the medicine.  If you have questions or concerns that the pharmacist cannot address, please call or return to the Emergency Department.   Remember that you can always come back to the Emergency Department if you are not able to see your regular provider in the amount of time listed above, if you get any new symptoms, or if there is anything that worries you.      Tomorrow morning, get back on your normal dose of Keppra.

## 2024-12-08 NOTE — ED TRIAGE NOTES
Pt reports hx of epilepsy and had a seizure at 1645. Pt reports the last thing she remembers is taking a shower. Her  found her at 1650 crying in her bed, the  took a shower and reports she was crying more. Pt bite the left side of her tongue and states she missed her morning dose of keppra. She did take 1,000mg keppra at 1730. Pt is 11 weeks pregnant. Pt denies vaginal bleeding or abd cramping

## 2024-12-16 ENCOUNTER — TRANSFERRED RECORDS (OUTPATIENT)
Dept: HEALTH INFORMATION MANAGEMENT | Facility: CLINIC | Age: 24
End: 2024-12-16
Payer: COMMERCIAL

## 2024-12-17 ENCOUNTER — PRE VISIT (OUTPATIENT)
Dept: MATERNAL FETAL MEDICINE | Facility: CLINIC | Age: 24
End: 2024-12-17
Payer: COMMERCIAL

## 2024-12-20 ENCOUNTER — LAB (OUTPATIENT)
Dept: LAB | Facility: CLINIC | Age: 24
End: 2024-12-20
Attending: OBSTETRICS & GYNECOLOGY
Payer: COMMERCIAL

## 2024-12-20 ENCOUNTER — HOSPITAL ENCOUNTER (OUTPATIENT)
Dept: ULTRASOUND IMAGING | Facility: CLINIC | Age: 24
Discharge: HOME | End: 2024-12-20
Attending: OBSTETRICS & GYNECOLOGY
Payer: COMMERCIAL

## 2024-12-20 ENCOUNTER — TRANSFERRED RECORDS (OUTPATIENT)
Dept: HEALTH INFORMATION MANAGEMENT | Facility: CLINIC | Age: 24
End: 2024-12-20

## 2024-12-20 DIAGNOSIS — O99.350 SEIZURE DISORDER IN PREGNANCY (H): ICD-10-CM

## 2024-12-20 DIAGNOSIS — Z36.0 ENCOUNTER FOR ANTENATAL SCREENING FOR CHROMOSOMAL ANOMALIES: ICD-10-CM

## 2024-12-20 DIAGNOSIS — G40.909 SEIZURE DISORDER IN PREGNANCY (H): ICD-10-CM

## 2024-12-20 PROCEDURE — 36415 COLL VENOUS BLD VENIPUNCTURE: CPT

## 2024-12-20 PROCEDURE — 76813 OB US NUCHAL MEAS 1 GEST: CPT

## 2025-01-02 ENCOUNTER — TELEPHONE (OUTPATIENT)
Dept: MATERNAL FETAL MEDICINE | Facility: CLINIC | Age: 25
End: 2025-01-02
Payer: COMMERCIAL

## 2025-01-02 LAB — SCANNED LAB RESULT: NORMAL

## 2025-01-02 NOTE — TELEPHONE ENCOUNTER
Called Gely and discussed low risk NIPT results.    Results indicate low risk for aneuploidy involving for chromosomes 21, 18, 13, or sex chromosomes. This puts her current pregnancy at low risk for Down syndrome, trisomy 18, trisomy 13 and sex chromosome abnormalities.  The microdeletion analysis portion of her NIPT testing indicated a low risk for microdeletions on chromosome 22q11.2, 15q11.2, 1p36, 4p- and 5p-.  Although these results are reassuring, this does not replace a standard chromosome analysis or CGH array from an amniocentesis.    Plan:  Gely is scheduled for a follow-up ultrasound at [facility] on 01/27/2025.    Paris Sepulveda MS, [facility]  Maternal [facility] Medicine  [facility]  Phone:[phone]     [phone], Narcisa Shaw

## 2025-01-31 ENCOUNTER — HOSPITAL ENCOUNTER (OUTPATIENT)
Dept: ULTRASOUND IMAGING | Facility: CLINIC | Age: 25
Discharge: HOME | End: 2025-01-31
Attending: OBSTETRICS & GYNECOLOGY
Payer: MEDICAID

## 2025-01-31 DIAGNOSIS — O99.351 SEIZURE DISORDER DURING PREGNANCY IN FIRST TRIMESTER (H): ICD-10-CM

## 2025-01-31 DIAGNOSIS — G40.909 SEIZURE DISORDER DURING PREGNANCY IN FIRST TRIMESTER (H): ICD-10-CM

## 2025-01-31 PROCEDURE — 76811 OB US DETAILED SNGL FETUS: CPT

## 2025-02-26 ENCOUNTER — OFFICE VISIT (OUTPATIENT)
Dept: MATERNAL FETAL MEDICINE | Facility: CLINIC | Age: 25
End: 2025-02-26
Attending: STUDENT IN AN ORGANIZED HEALTH CARE EDUCATION/TRAINING PROGRAM
Payer: MEDICAID

## 2025-02-26 ENCOUNTER — HOSPITAL ENCOUNTER (OUTPATIENT)
Dept: ULTRASOUND IMAGING | Facility: CLINIC | Age: 25
Discharge: HOME | End: 2025-02-26
Attending: STUDENT IN AN ORGANIZED HEALTH CARE EDUCATION/TRAINING PROGRAM
Payer: MEDICAID

## 2025-02-26 DIAGNOSIS — O26.92 PREGNANCY RELATED CONDITION IN SECOND TRIMESTER: Primary | ICD-10-CM

## 2025-02-26 DIAGNOSIS — G40.909 SEIZURE DISORDER DURING PREGNANCY IN SECOND TRIMESTER (H): ICD-10-CM

## 2025-02-26 DIAGNOSIS — O99.352 SEIZURE DISORDER DURING PREGNANCY IN SECOND TRIMESTER (H): ICD-10-CM

## 2025-02-26 DIAGNOSIS — Z36.2 ENCOUNTER FOR FOLLOW-UP ULTRASOUND OF FETAL ANATOMY: ICD-10-CM

## 2025-02-26 PROCEDURE — 76816 OB US FOLLOW-UP PER FETUS: CPT

## 2025-02-26 NOTE — PROGRESS NOTES
The patient was seen for an ultrasound in the [facility] today.  For a detailed report of the ultrasound examination, please see the ultrasound report which can be found under the imaging tab.    If you have questions regarding today's evaluation or if we can be of further service, please contact the [facility].    Paris Perez MD  , OB/GYN  [facility]etal [facility]

## 2025-02-26 NOTE — NURSING NOTE
Patient here for Growth Ultrasound.  Patient reports fetal flutterings. Denies pain, contractions, leaking of fluid, or bleeding.  Patient denies headache, visual changes, nausea/vomiting, epigastric pain related to preeclampsia. Does report increase in headaches. Taking Tylenol as needed. Blood pressures have been WNL. Education provided to patient on hydration and electrolytes in pregnancy. Seizure disorder stable on Keppra.  SBAR given to ESTEPHANIA GARCIA, see their note in Epic.

## 2025-03-13 ENCOUNTER — LAB (OUTPATIENT)
Dept: LAB | Facility: CLINIC | Age: 25
End: 2025-03-13
Payer: MEDICAID

## 2025-03-13 DIAGNOSIS — G40.901 NONINTRACTABLE EPILEPSY WITH STATUS EPILEPTICUS, UNSPECIFIED EPILEPSY TYPE (H): ICD-10-CM

## 2025-03-13 DIAGNOSIS — G40.909 NONINTRACTABLE EPILEPSY WITHOUT STATUS EPILEPTICUS, UNSPECIFIED EPILEPSY TYPE (H): Primary | ICD-10-CM

## 2025-03-13 LAB — LEVETIRACETAM SERPL-MCNC: 6.4 ÂΜG/ML (ref 10–40)

## 2025-03-17 ENCOUNTER — HOSPITAL ENCOUNTER (EMERGENCY)
Facility: CLINIC | Age: 25
Discharge: HOME | End: 2025-03-17
Attending: EMERGENCY MEDICINE | Admitting: EMERGENCY MEDICINE
Payer: MEDICAID

## 2025-03-17 VITALS
DIASTOLIC BLOOD PRESSURE: 66 MMHG | RESPIRATION RATE: 26 BRPM | HEART RATE: 91 BPM | TEMPERATURE: 97.9 F | WEIGHT: 196.87 LBS | SYSTOLIC BLOOD PRESSURE: 103 MMHG | OXYGEN SATURATION: 100 % | BODY MASS INDEX: 31.78 KG/M2

## 2025-03-17 DIAGNOSIS — E83.42 HYPOMAGNESEMIA: ICD-10-CM

## 2025-03-17 DIAGNOSIS — R56.9 SEIZURE (H): ICD-10-CM

## 2025-03-17 LAB
ALBUMIN SERPL BCG-MCNC: 3.5 G/DL (ref 3.5–5.2)
ALBUMIN UR-MCNC: 70 MG/DL
ALP SERPL-CCNC: 79 U/L (ref 40–150)
ALT SERPL W P-5'-P-CCNC: 41 U/L (ref 0–50)
ANION GAP SERPL CALCULATED.3IONS-SCNC: 9 MMOL/L (ref 7–15)
APPEARANCE UR: CLEAR
AST SERPL W P-5'-P-CCNC: 35 U/L (ref 0–45)
ATRIAL RATE - MUSE: 83 BPM
BASOPHILS # BLD AUTO: 0 10E3/UL (ref 0–0.2)
BASOPHILS NFR BLD AUTO: 0 %
BILIRUB SERPL-MCNC: <0.2 MG/DL
BILIRUB UR QL STRIP: NEGATIVE
BUN SERPL-MCNC: 5.9 MG/DL (ref 6–20)
CALCIUM SERPL-MCNC: 8.7 MG/DL (ref 8.8–10.4)
CHLORIDE SERPL-SCNC: 101 MMOL/L (ref 98–107)
COLOR UR AUTO: YELLOW
CREAT SERPL-MCNC: 0.44 MG/DL (ref 0.51–0.95)
DIASTOLIC BLOOD PRESSURE - MUSE: NORMAL MMHG
EGFRCR SERPLBLD CKD-EPI 2021: >90 ML/MIN/1.73M2
EOSINOPHIL # BLD AUTO: 0.1 10E3/UL (ref 0–0.7)
EOSINOPHIL NFR BLD AUTO: 1 %
ERYTHROCYTE [DISTWIDTH] IN BLOOD BY AUTOMATED COUNT: 12.9 % (ref 10–15)
GLUCOSE SERPL-MCNC: 119 MG/DL (ref 70–99)
GLUCOSE UR STRIP-MCNC: NEGATIVE MG/DL
HCO3 SERPL-SCNC: 23 MMOL/L (ref 22–29)
HCT VFR BLD AUTO: 32.3 % (ref 35–47)
HGB BLD-MCNC: 11.1 G/DL (ref 11.7–15.7)
HGB UR QL STRIP: NEGATIVE
HOLD SPECIMEN: NORMAL
HOLD SPECIMEN: NORMAL
IMM GRANULOCYTES # BLD: 0.1 10E3/UL
IMM GRANULOCYTES NFR BLD: 1 %
INTERPRETATION ECG - MUSE: NORMAL
KETONES UR STRIP-MCNC: ABNORMAL MG/DL
LEUKOCYTE ESTERASE UR QL STRIP: NEGATIVE
LYMPHOCYTES # BLD AUTO: 1.3 10E3/UL (ref 0.8–5.3)
LYMPHOCYTES NFR BLD AUTO: 12 %
MAGNESIUM SERPL-MCNC: 1.5 MG/DL (ref 1.7–2.3)
MCH RBC QN AUTO: 28.9 PG (ref 26.5–33)
MCHC RBC AUTO-ENTMCNC: 34.4 G/DL (ref 31.5–36.5)
MCV RBC AUTO: 84 FL (ref 78–100)
MONOCYTES # BLD AUTO: 0.7 10E3/UL (ref 0–1.3)
MONOCYTES NFR BLD AUTO: 6 %
MUCOUS THREADS #/AREA URNS LPF: PRESENT /LPF
NEUTROPHILS # BLD AUTO: 8.8 10E3/UL (ref 1.6–8.3)
NEUTROPHILS NFR BLD AUTO: 81 %
NITRATE UR QL: NEGATIVE
NRBC # BLD AUTO: 0 10E3/UL
NRBC BLD AUTO-RTO: 0 /100
P AXIS - MUSE: 52 DEGREES
PH UR STRIP: 6.5 [PH] (ref 5–7)
PLATELET # BLD AUTO: 246 10E3/UL (ref 150–450)
POTASSIUM SERPL-SCNC: 3.7 MMOL/L (ref 3.4–5.3)
PR INTERVAL - MUSE: 128 MS
PROT SERPL-MCNC: 6.4 G/DL (ref 6.4–8.3)
QRS DURATION - MUSE: 82 MS
QT - MUSE: 358 MS
QTC - MUSE: 420 MS
R AXIS - MUSE: 41 DEGREES
RBC # BLD AUTO: 3.84 10E6/UL (ref 3.8–5.2)
RBC URINE: 2 /HPF
SODIUM SERPL-SCNC: 133 MMOL/L (ref 135–145)
SP GR UR STRIP: 1.02 (ref 1–1.03)
SQUAMOUS EPITHELIAL: <1 /HPF
SYSTOLIC BLOOD PRESSURE - MUSE: NORMAL MMHG
T AXIS - MUSE: 13 DEGREES
UROBILINOGEN UR STRIP-MCNC: NORMAL MG/DL
VENTRICULAR RATE- MUSE: 83 BPM
WBC # BLD AUTO: 10.9 10E3/UL (ref 4–11)
WBC URINE: 2 /HPF

## 2025-03-17 PROCEDURE — 80048 BASIC METABOLIC PNL TOTAL CA: CPT | Performed by: EMERGENCY MEDICINE

## 2025-03-17 PROCEDURE — 83735 ASSAY OF MAGNESIUM: CPT | Performed by: EMERGENCY MEDICINE

## 2025-03-17 PROCEDURE — 82040 ASSAY OF SERUM ALBUMIN: CPT | Performed by: EMERGENCY MEDICINE

## 2025-03-17 PROCEDURE — 93005 ELECTROCARDIOGRAM TRACING: CPT

## 2025-03-17 PROCEDURE — 81001 URINALYSIS AUTO W/SCOPE: CPT | Performed by: EMERGENCY MEDICINE

## 2025-03-17 PROCEDURE — 99284 EMERGENCY DEPT VISIT MOD MDM: CPT | Mod: 25

## 2025-03-17 PROCEDURE — 82247 BILIRUBIN TOTAL: CPT | Performed by: EMERGENCY MEDICINE

## 2025-03-17 PROCEDURE — 85018 HEMOGLOBIN: CPT | Performed by: EMERGENCY MEDICINE

## 2025-03-17 PROCEDURE — 36415 COLL VENOUS BLD VENIPUNCTURE: CPT | Performed by: EMERGENCY MEDICINE

## 2025-03-17 PROCEDURE — 96365 THER/PROPH/DIAG IV INF INIT: CPT

## 2025-03-17 PROCEDURE — 250N000011 HC RX IP 250 OP 636: Performed by: EMERGENCY MEDICINE

## 2025-03-17 PROCEDURE — 85004 AUTOMATED DIFF WBC COUNT: CPT | Performed by: EMERGENCY MEDICINE

## 2025-03-17 RX ORDER — MAGNESIUM SULFATE HEPTAHYDRATE 40 MG/ML
2 INJECTION, SOLUTION INTRAVENOUS ONCE
Status: COMPLETED | OUTPATIENT
Start: 2025-03-17 | End: 2025-03-17

## 2025-03-17 RX ADMIN — MAGNESIUM SULFATE HEPTAHYDRATE 2 G: 40 INJECTION, SOLUTION INTRAVENOUS at 09:52

## 2025-03-17 ASSESSMENT — COLUMBIA-SUICIDE SEVERITY RATING SCALE - C-SSRS
1. IN THE PAST MONTH, HAVE YOU WISHED YOU WERE DEAD OR WISHED YOU COULD GO TO SLEEP AND NOT WAKE UP?: NO
2. HAVE YOU ACTUALLY HAD ANY THOUGHTS OF KILLING YOURSELF IN THE PAST MONTH?: NO
6. HAVE YOU EVER DONE ANYTHING, STARTED TO DO ANYTHING, OR PREPARED TO DO ANYTHING TO END YOUR LIFE?: NO

## 2025-03-17 ASSESSMENT — ACTIVITIES OF DAILY LIVING (ADL)
ADLS_ACUITY_SCORE: 41

## 2025-03-17 NOTE — ED PROVIDER NOTES
Emergency Department Note      History of Present Illness     Chief Complaint   Seizures      HPI   Gely Naranjo is a 24 year old female  25 weeks pregnant with history of seizures who presents to the ED with a seizure.  Patient reports having a seizure this morning while she was eating breakfast 3.5 hours ago around 5:30am. She was alone and assumes she fell and hit her head, she complains of pain on the left back side of her head. Before the seizure she got nauseous and vomited, then very confused before seizing. She has not missed any doses of Keppra and took some this morning. Her last seizure was in December, prior to that she went a year without seizures. She denies pain in the rest of her body including her neck. She denies urinary symptoms such as burning or hematuria, vaginal bleeding or discharge, chest pain, shortness of breath, or neck pain. She follows a regular OB at [facility].       Independent Historian   None    Review of External Notes   25: Clinic note reviewed    Past Medical History     Medical History and Problem List   Seizure disorder  Vitamin D deficiency       Medications   Keppra   Prilosec   Vitamin D3  Strattera     Surgical History   The patient has no pertinent past surgical history.     Physical Exam     Patient Vitals for the past 24 hrs:   BP Temp Temp src Pulse Resp SpO2 Weight   25 1045 -- -- -- 91 26 100 % --   25 1030 103/66 -- -- 95 19 100 % --   25 1000 115/71 -- -- 88 26 100 % --   25 0945 112/72 -- -- 87 20 100 % --   25 0930 100/72 -- -- 90 21 100 % --   25 0915 105/66 -- -- 96 21 100 % --   25 0831 117/74 97.9  F (36.6  C) Oral 91 18 99 % 89.3 kg (196 lb 13.9 oz)     Physical Exam  General: No acute distress  Head: No obvious trauma to head. No hematoma palpated.   Ears, Nose, Throat:  External ears normal.  Nose normal.  No pharyngeal erythema, swelling or exudate.  Midline uvula. Moist mucus membranes. EOMI. PERRL. No  nystagmus.   Eyes:  Conjunctivae clear.   Neck: Normal range of motion.  Neck supple. Nontender to palpation.  CV: Regular rate and rhythm.  No murmurs.      Respiratory: Effort normal and breath sounds normal.  No wheezing or crackles.   Gastrointestinal: Soft.  No distension. There is no tenderness.  There is no rigidity, no rebound and no guarding.   Musculoskeletal: Normal range of motion.  Non tender extremities to palpations. No lower extremity edema  Neuro: Alert. Moving all extremities appropriately.  Normal speech.    Skin: Skin is warm and dry.  No rash noted.   Psych: Normal mood and affect. Behavior is normal.      Diagnostics     Lab Results   Labs Ordered and Resulted from Time of ED Arrival to Time of ED Departure   COMPREHENSIVE METABOLIC PANEL - Abnormal       Result Value    Sodium 133 (*)     Potassium 3.7      Carbon Dioxide (CO2) 23      Anion Gap 9      Urea Nitrogen 5.9 (*)     Creatinine 0.44 (*)     GFR Estimate >90      Calcium 8.7 (*)     Chloride 101      Glucose 119 (*)     Alkaline Phosphatase 79      AST 35      ALT 41      Protein Total 6.4      Albumin 3.5      Bilirubin Total <0.2     CBC WITH PLATELETS AND DIFFERENTIAL - Abnormal    WBC Count 10.9      RBC Count 3.84      Hemoglobin 11.1 (*)     Hematocrit 32.3 (*)     MCV 84      MCH 28.9      MCHC 34.4      RDW 12.9      Platelet Count 246      % Neutrophils 81      % Lymphocytes 12      % Monocytes 6      % Eosinophils 1      % Basophils 0      % Immature Granulocytes 1      NRBCs per 100 WBC 0      Absolute Neutrophils 8.8 (*)     Absolute Lymphocytes 1.3      Absolute Monocytes 0.7      Absolute Eosinophils 0.1      Absolute Basophils 0.0      Absolute Immature Granulocytes 0.1      Absolute NRBCs 0.0     MAGNESIUM - Abnormal    Magnesium 1.5 (*)    ROUTINE UA WITH MICROSCOPIC REFLEX TO CULTURE - Abnormal    Color Urine Yellow      Appearance Urine Clear      Glucose Urine Negative      Bilirubin Urine Negative      Ketones  Urine Trace (*)     Specific Gravity Urine 1.022      Blood Urine Negative      pH Urine 6.5      Protein Albumin Urine 70 (*)     Urobilinogen Urine Normal      Nitrite Urine Negative      Leukocyte Esterase Urine Negative      Mucus Urine Present (*)     RBC Urine 2      WBC Urine 2      Squamous Epithelials Urine <1         Imaging   None    EKG   ECG taken at 0902, ECG read at 0914  ECG results from 03/17/25   EKG 12 lead     Value    Systolic Blood Pressure     Diastolic Blood Pressure     Ventricular Rate 83    Atrial Rate 83    CO Interval 128    QRS Duration 82        QTc 420    P Axis 52    R AXIS 41    T Axis 13    Interpretation ECG      Sinus rhythm  Normal ECG  When compared with ECG of 23-Nov-2023 01:00,  No significant change was found  Confirmed by - EMERGENCY ROOM, PHYSICIAN ([id number]),  Emmanuel Warner ([id number]) on 3/17/2025 9:15:36 AM           Independent Interpretation   None    ED Course      Medications Administered   Medications   magnesium sulfate 2 g in 50 mL sterile water intermittent infusion (0 g Intravenous Stopped 3/17/25 1034)       Procedures   Procedures     Discussion of Management   None    ED Course   ED Course as of 03/17/25 1408   Mon Mar 17, 2025   0849 I obtained history and examined the patient as noted above.    1039 I rechecked the patient and explained findings.        Additional Documentation  None    Medical Decision Making / Diagnosis     CMS Diagnoses: None    MIPS       None    MDM   Gely Naranjo is a 24 year old female presenting after a seizure episode.  She has no signs of trauma.  She is neurologically intact.  UA is negative for UTI.  Blood work is grossly unremarkable, except for mildly low magnesium.  She is given IV magnesium.  Will be nursing comes down to evaluate fetal heart tones.  They report that the fetal monitoring appears normal.  Her seizure today seems to be most consistent with her prior seizure diagnosis, and not related to preeclampsia.   Blood pressure is normal and her LFTs are within normal limits.  She is at her baseline and feels comfortable going home with her significant other.  She is encouraged to follow-up with her neurologist.  Return precautions are given and she verbalizes understanding.  She is discharged home in stable condition.    Disposition   The patient was discharged.     Diagnosis     ICD-10-CM    1. Seizure (H)  R56.9            Discharge Medications   Discharge Medication List as of 3/17/2025 10:47 AM            Scribe Disclosure:  Contreras KING, am serving as a scribe at 8:45 AM on 3/17/2025 to document services personally performed by Clayton Hein MD based on my observations and the provider's statements to me.        Clayton Hein MD  03/17/25 [id number]

## 2025-03-17 NOTE — ED NOTES
Reports unwitnessed seizure at home of unknown duration. States vomited after coming too. Appears at baseline currently.

## 2025-03-17 NOTE — ED TRIAGE NOTES
Pt arrives to ED after having a seizure at home this AM. Hx of seizure disorder. Pt is 25 weeks pregnant. Unsure of duration of seizure. Endorses medication compliance. States she hit her head.

## 2025-03-17 NOTE — CARE PLAN
Pt has H/O Epilepsy, had an episodes of seizures this AM.Pt is Primi  25 weeks pregnant.Pt said she sees [facility] Drs.  Requested by ED to monitor FHT.  0958 Here to monitor FHT,checked with Doppler regular 145.  Pt denies cramping or conts,LOF,or VB,pt reports baby is moving.  1000 EFM applied,FHT Monitored for 20-25 and  is appropriate to GA.

## 2025-03-17 NOTE — DISCHARGE INSTRUCTIONS
The baby looks good.  Your lab work only showed slightly low magnesium, so we gave you IV magnesium.  We recommend that you continue taking your medication as prescribed and to call your neurologist today to set up a follow-up appointment as soon as possible.  Please return to the emergency department if you develop any new or concerning symptoms.

## 2025-03-17 NOTE — ED NOTES
Bed: ED01  Expected date:   Expected time:   Means of arrival:   Comments:  Hold for [facility]

## 2025-04-02 ENCOUNTER — LAB REQUISITION (OUTPATIENT)
Dept: LAB | Facility: CLINIC | Age: 25
End: 2025-04-02

## 2025-04-02 DIAGNOSIS — Z36.89 ENCOUNTER FOR OTHER SPECIFIED ANTENATAL SCREENING: ICD-10-CM

## 2025-04-02 LAB
ERYTHROCYTE [DISTWIDTH] IN BLOOD BY AUTOMATED COUNT: 12.8 % (ref 10–15)
HCT VFR BLD AUTO: 30.9 % (ref 35–47)
HGB BLD-MCNC: 10 G/DL (ref 11.7–15.7)
MCH RBC QN AUTO: 27.9 PG (ref 26.5–33)
MCHC RBC AUTO-ENTMCNC: 32.4 G/DL (ref 31.5–36.5)
MCV RBC AUTO: 86 FL (ref 78–100)
PLATELET # BLD AUTO: 231 10E3/UL (ref 150–450)
RBC # BLD AUTO: 3.59 10E6/UL (ref 3.8–5.2)
WBC # BLD AUTO: 9.8 10E3/UL (ref 4–11)

## 2025-04-02 PROCEDURE — 86592 SYPHILIS TEST NON-TREP QUAL: CPT | Performed by: ADVANCED PRACTICE MIDWIFE

## 2025-04-02 PROCEDURE — 85014 HEMATOCRIT: CPT | Performed by: ADVANCED PRACTICE MIDWIFE

## 2025-04-03 LAB — RPR SER QL: NONREACTIVE

## 2025-04-08 ENCOUNTER — LAB (OUTPATIENT)
Dept: LAB | Facility: CLINIC | Age: 25
End: 2025-04-08
Payer: COMMERCIAL

## 2025-04-08 DIAGNOSIS — G40.909 NONINTRACTABLE EPILEPSY WITHOUT STATUS EPILEPTICUS, UNSPECIFIED EPILEPSY TYPE (H): ICD-10-CM

## 2025-04-08 LAB — LEVETIRACETAM SERPL-MCNC: 11 ÂΜG/ML (ref 10–40)

## 2025-04-08 PROCEDURE — 80177 DRUG SCRN QUAN LEVETIRACETAM: CPT

## 2025-04-08 PROCEDURE — 36415 COLL VENOUS BLD VENIPUNCTURE: CPT

## 2025-04-27 ENCOUNTER — HOSPITAL ENCOUNTER (EMERGENCY)
Facility: CLINIC | Age: 25
Discharge: HOME | End: 2025-04-27
Attending: EMERGENCY MEDICINE | Admitting: EMERGENCY MEDICINE
Payer: COMMERCIAL

## 2025-04-27 VITALS
DIASTOLIC BLOOD PRESSURE: 67 MMHG | HEART RATE: 84 BPM | BODY MASS INDEX: 32.81 KG/M2 | RESPIRATION RATE: 25 BRPM | TEMPERATURE: 97.6 F | WEIGHT: 203.26 LBS | OXYGEN SATURATION: 99 % | SYSTOLIC BLOOD PRESSURE: 109 MMHG

## 2025-04-27 DIAGNOSIS — G40.909 SEIZURE DISORDER (H): ICD-10-CM

## 2025-04-27 LAB
ANION GAP SERPL CALCULATED.3IONS-SCNC: 9 MMOL/L (ref 7–15)
BASOPHILS # BLD AUTO: 0 10E3/UL (ref 0–0.2)
BASOPHILS NFR BLD AUTO: 0 %
BUN SERPL-MCNC: 5.8 MG/DL (ref 6–20)
CALCIUM SERPL-MCNC: 8.6 MG/DL (ref 8.8–10.4)
CHLORIDE SERPL-SCNC: 103 MMOL/L (ref 98–107)
CREAT SERPL-MCNC: 0.41 MG/DL (ref 0.51–0.95)
EGFRCR SERPLBLD CKD-EPI 2021: >90 ML/MIN/1.73M2
EOSINOPHIL # BLD AUTO: 0 10E3/UL (ref 0–0.7)
EOSINOPHIL NFR BLD AUTO: 0 %
ERYTHROCYTE [DISTWIDTH] IN BLOOD BY AUTOMATED COUNT: 13.2 % (ref 10–15)
GLUCOSE SERPL-MCNC: 110 MG/DL (ref 70–99)
HCO3 SERPL-SCNC: 22 MMOL/L (ref 22–29)
HCT VFR BLD AUTO: 31.1 % (ref 35–47)
HGB BLD-MCNC: 10.1 G/DL (ref 11.7–15.7)
HOLD SPECIMEN: NORMAL
IMM GRANULOCYTES # BLD: 0.1 10E3/UL
IMM GRANULOCYTES NFR BLD: 1 %
LEVETIRACETAM SERPL-MCNC: 37.7 ÂΜG/ML (ref 10–40)
LYMPHOCYTES # BLD AUTO: 1.3 10E3/UL (ref 0.8–5.3)
LYMPHOCYTES NFR BLD AUTO: 12 %
MCH RBC QN AUTO: 26.6 PG (ref 26.5–33)
MCHC RBC AUTO-ENTMCNC: 32.5 G/DL (ref 31.5–36.5)
MCV RBC AUTO: 82 FL (ref 78–100)
MONOCYTES # BLD AUTO: 0.6 10E3/UL (ref 0–1.3)
MONOCYTES NFR BLD AUTO: 6 %
NEUTROPHILS # BLD AUTO: 8.9 10E3/UL (ref 1.6–8.3)
NEUTROPHILS NFR BLD AUTO: 81 %
NRBC # BLD AUTO: 0 10E3/UL
NRBC BLD AUTO-RTO: 0 /100
PLATELET # BLD AUTO: 242 10E3/UL (ref 150–450)
POTASSIUM SERPL-SCNC: 3.6 MMOL/L (ref 3.4–5.3)
RBC # BLD AUTO: 3.8 10E6/UL (ref 3.8–5.2)
SODIUM SERPL-SCNC: 134 MMOL/L (ref 135–145)
WBC # BLD AUTO: 11 10E3/UL (ref 4–11)

## 2025-04-27 PROCEDURE — 93005 ELECTROCARDIOGRAM TRACING: CPT | Mod: 59

## 2025-04-27 PROCEDURE — 99284 EMERGENCY DEPT VISIT MOD MDM: CPT | Mod: 25

## 2025-04-27 PROCEDURE — 85025 COMPLETE CBC W/AUTO DIFF WBC: CPT | Performed by: EMERGENCY MEDICINE

## 2025-04-27 PROCEDURE — 80048 BASIC METABOLIC PNL TOTAL CA: CPT | Performed by: EMERGENCY MEDICINE

## 2025-04-27 PROCEDURE — 80177 DRUG SCRN QUAN LEVETIRACETAM: CPT | Performed by: EMERGENCY MEDICINE

## 2025-04-27 PROCEDURE — 76815 OB US LIMITED FETUS(S): CPT

## 2025-04-27 PROCEDURE — 36415 COLL VENOUS BLD VENIPUNCTURE: CPT | Performed by: EMERGENCY MEDICINE

## 2025-04-27 ASSESSMENT — COLUMBIA-SUICIDE SEVERITY RATING SCALE - C-SSRS
2. HAVE YOU ACTUALLY HAD ANY THOUGHTS OF KILLING YOURSELF IN THE PAST MONTH?: NO
6. HAVE YOU EVER DONE ANYTHING, STARTED TO DO ANYTHING, OR PREPARED TO DO ANYTHING TO END YOUR LIFE?: NO
1. IN THE PAST MONTH, HAVE YOU WISHED YOU WERE DEAD OR WISHED YOU COULD GO TO SLEEP AND NOT WAKE UP?: NO

## 2025-04-27 ASSESSMENT — ACTIVITIES OF DAILY LIVING (ADL)
ADLS_ACUITY_SCORE: 41
ADLS_ACUITY_SCORE: 41

## 2025-04-27 NOTE — ED PROVIDER NOTES
Emergency Department Note      History of Present Illness     Chief Complaint   Seizures      HPI   Gely Naranjo is a 24 year old female  at 31 weeks with history of epilepsy on Keppra presenting to the emergency department with concerns for seizure.  Patient states that around 1 PM, she was walking back to her room for her bathroom which is short distance when she woke up on her bed.  She has no recollection of the time from walking from the bathroom and being on the bed.  She noticed her glasses were on the floor.  She is concerned she had a seizure.  Denies any prodromal symptoms prior and denies auras related to her seizure.  She is otherwise been feeling well for the last few days.  Denies any headache, fever, URI symptoms, chest pain, shortness of breath, cough, abdominal pain, pelvic cramping, vaginal discharge/bleeding, urinary symptoms.  Her Keppra was increased to 4 g daily from her last ER visit for seizure 3/17.  Reports she feels like she has been getting adequate sleep and has been feeling well for the last few days.  Reports she bit her tongue but no urinary incontinence.      Independent Historian   None    Review of External Notes   None    Past Medical History     Medical History and Problem List   Past Medical History:   Diagnosis Date    Vitamin D deficiency        Medications   levETIRAcetam (KEPPRA) 500 MG tablet  omeprazole (PRILOSEC) 20 MG DR capsule  vitamin D3 (CHOLECALCIFEROL) 50 mcg (2000 units) tablet        Surgical History   No past surgical history on file.    Physical Exam     Patient Vitals for the past 24 hrs:   BP Temp Temp src Pulse Resp SpO2 Weight   25 1702 109/67 -- -- 84 25 99 % --   25 1630 101/84 -- -- 98 21 98 % --   25 1615 106/62 -- -- 102 23 98 % --   25 1556 108/70 97.6  F (36.4  C) Temporal 99 16 99 % 92.2 kg (203 lb 4.2 oz)     Physical Exam  General: Alert, no acute distress; answering questions appropriately  Neuro:  PERRL.  EOMI.  No  focal deficits; GCS 15.   HEENT:  Moist mucous membranes.  Conjunctiva normal. Left side tongue abrasion.  CV:  RRR, no m/r/g, skin warm and well perfused  Pulm:  CTAB, no wheezes/ronchi/rales.  No acute distress, breathing comfortably  GI:  Soft, nontender, nondistended.  No rebound or guarding.  Gravid, nontender uterus.  MSK:  Moving all extremities.  No focal areas of edema, erythema  Skin:  WWP, no rashes, no lower extremity edema, skin color normal, no diaphoresis    Diagnostics     Lab Results   Labs Ordered and Resulted from Time of ED Arrival to Time of ED Departure   BASIC METABOLIC PANEL - Abnormal       Result Value    Sodium 134 (*)     Potassium 3.6      Chloride 103      Carbon Dioxide (CO2) 22      Anion Gap 9      Urea Nitrogen 5.8 (*)     Creatinine 0.41 (*)     GFR Estimate >90      Calcium 8.6 (*)     Glucose 110 (*)    CBC WITH PLATELETS AND DIFFERENTIAL - Abnormal    WBC Count 11.0      RBC Count 3.80      Hemoglobin 10.1 (*)     Hematocrit 31.1 (*)     MCV 82      MCH 26.6      MCHC 32.5      RDW 13.2      Platelet Count 242      % Neutrophils 81      % Lymphocytes 12      % Monocytes 6      % Eosinophils 0      % Basophils 0      % Immature Granulocytes 1      NRBCs per 100 WBC 0      Absolute Neutrophils 8.9 (*)     Absolute Lymphocytes 1.3      Absolute Monocytes 0.6      Absolute Eosinophils 0.0      Absolute Basophils 0.0      Absolute Immature Granulocytes 0.1      Absolute NRBCs 0.0     KEPPRA (LEVETIRACETAM) LEVEL       Imaging   POC US OB TRANSABDOMINAL LIMITED   Final Result     Limited Obstetric Ultrasound      Procedure Note: POC Ultrasound Limited Obstetric Exam      (This is a limited bedside US which does not assess fetal health and is not a formal OB ultrasound)       Indication: Syncope/seizure      Views: Suprapubic longitudinal       Findings: Intrauterine pregnancy, No free fluid, Fetal cardiac activity, and  BPM       Impression: Intrauterine pregnancy , Fetal  cardiac activity present , and No free fluid visualized       Study performed by: Denilosn Clark MD       Images archived: Yes           EKG   ECG results from 04/27/25   EKG 12 lead     Value    Systolic Blood Pressure     Diastolic Blood Pressure     Ventricular Rate 98    Atrial Rate 98    OK Interval 116    QRS Duration 82        QTc 436    P Axis 56    R AXIS 40    T Axis -1    Interpretation ECG      Sinus rhythm  Nonspecific ST and T wave abnormality  Abnormal ECG  When compared with ECG of 17-Mar-2025 09:02,  No significant change was found       Independent Interpretation   None    ED Course      Medications Administered   Medications - No data to display    Procedures   Procedures     POC US OB TRANSABDOMINAL LIMITED   Final Result     Limited Obstetric Ultrasound      Procedure Note: POC Ultrasound Limited Obstetric Exam      (This is a limited bedside US which does not assess fetal health and is not a formal OB ultrasound)       Indication: Syncope/seizure      Views: Suprapubic longitudinal       Findings: Intrauterine pregnancy, No free fluid, Fetal cardiac activity, and  BPM       Impression: Intrauterine pregnancy , Fetal cardiac activity present , and No free fluid visualized       Study performed by: Denilson Clark MD       Images archived: Yes             Discussion of Management   None    ED Course        Additional Documentation  None    Medical Decision Making / Diagnosis     CMS Diagnoses: None    MIPS       None    MDM   Gely Naranjo is a 24 year old female with history of seizure disorder currently 31 weeks pregnant presenting to the emergency department for evaluation of concern for seizure at home.  See above for the details in HPI and exam.  Patient is on Keppra and denies missing any doses.  She is neurologically intact in her normal baseline mentation.  Has otherwise been feeling well over the last few days.  Denies any specific complaints currently including abdominal  pain or pelvic pain.  She is unsure if she hit her abdomen but has a completely benign abdominal exam.  I did perform bedside ultrasound which showed reassuring fetal heart rate with good fetal movements.  Screening EKG shows no acute ischemic appearing changes or signs of malignant dysrhythmia.  Basic lab studies above show no evidence of severe anemia or severe electrolyte/metabolic derangement.  Antiepileptic level was sent.  She remains well-appearing here.  I do not feel any advanced head imaging is indicated as I have very low suspicion for intracranial catastrophe and no signs concerning for CNS infection.  With reasonable clinical certainty, do feel that the patient is safe to discharge home to follow-up with her neurologist regarding her visit today and to follow-up with her Keppra levels.  She is comfortable with this plan.  Discussed the signs and symptoms that should prompt urgent return to the emergency department.  All questions were answered prior to discharge.    Disposition   The patient was discharged.     Diagnosis     ICD-10-CM    1. Seizure disorder (H)  G40.909            Discharge Medications   Discharge Medication List as of 4/27/2025  5:11 PM            MD Amber Ascencio Edgar Ronald, MD  04/27/25 1738

## 2025-04-27 NOTE — ED TRIAGE NOTES
Patient suspects that she had a seizure today. She states that the last thing she remembers was that she was going to the bathroom. Then next thing she remembers is waking up in bed. No one home with her during this time. Hx of seizure disorder. Last seizure in march. At that time they increased her dose of keppra. She has been stable on that dose since then. 31 weeks pregnant. Patient complains of head pain and left shoulder pain as well.

## 2025-04-27 NOTE — DISCHARGE INSTRUCTIONS
Follow up with your neurologist regarding your ER visit today and to follow up on your Seizure medication levels    Discharge Instructions  Recurrent Seizure (Convulsion)    You were seen today for a seizure. The most common reason for a recurrent seizure is having missed a dose of your medication or taken it at a different time than normal. Other things that increase the risk of seizures include fever, sleep deprivation, alcohol, and stress. Although anti-seizure medications (anti-epileptic drugs) work for many people with seizure disorders, some people continue to have seizures even after trying several medications.    Generally, every Emergency Department visit should have a follow-up clinic visit with either a primary or a specialty clinic/provider. Please follow-up as instructed by your emergency provider today.    Return to the Emergency Department if:   You develop a fever over 100.4 F.  You feel much more ill, or develop new symptoms like severe headache.  You have trouble walking, seeing, or develop weakness or numbness in your arms or legs.     What can I do to help myself?  Take your medication exactly as directed, at the right times, and the right doses.   If you develop uncomfortable side effects, do not stop taking your anti-seizure medication without first speaking to your provider.   Do not let your prescription run out. Stopping anti-seizure medication abruptly can put you at risk of a seizure.  While taking an anti-seizure medication, do not start taking any other medications including over-the-counter medications and herbal supplements without first checking with your provider because mixing them can be dangerous.  Do not drive until you have been rechecked by your provider and have been told it is safe to drive.  If you have a seizure while driving you may cause a motor vehicle accident with injury or death to yourself or others.   Do not swim, climb ladders, or do anything else that would be  dangerous if you had another seizure or spell of loss of consciousness, until you are cleared by your provider.    Check your state driving requirements for patients with seizures on the [employer]ile[employer] Website at https://www.epilepsy.com/lifestyle/driving-and-transportation/laws.  Do not drink alcohol.  Drinking alcohol increases the risk of seizures and can interfere with the effect of anti-seizure medications.  Start a seizure calendar to record any seizure triggers, such as days when you were sleep-deprived, stressed, drank alcohol, or (if you are a woman) had your period.  Remember, if one medication does not work for you, either because you cannot tolerate the side effects or because you continue to have seizures, your provider can suggest alternate medications or alternate methods of taking the medication.  If you were given a prescription for medicine here today, be sure to read all of the information (including the package insert) that comes with your prescription.  This will include important information about the medicine, its side effects, and any warnings that you need to know about.  The pharmacist who fills the prescription can provide more information and answer questions you may have about the medicine.  If you have questions or concerns that the pharmacist cannot address, please call or return to the Emergency Department.   Remember that you can always come back to the Emergency Department if you are not able to see your regular provider in the amount of time listed above, if you get any new symptoms, or if there is anything that worries you.

## 2025-04-28 DIAGNOSIS — G40.909 EPILEPSY (H): Primary | ICD-10-CM

## 2025-04-28 LAB
ATRIAL RATE - MUSE: 98 BPM
DIASTOLIC BLOOD PRESSURE - MUSE: NORMAL MMHG
INTERPRETATION ECG - MUSE: NORMAL
P AXIS - MUSE: 56 DEGREES
PR INTERVAL - MUSE: 116 MS
QRS DURATION - MUSE: 82 MS
QT - MUSE: 342 MS
QTC - MUSE: 436 MS
R AXIS - MUSE: 40 DEGREES
SYSTOLIC BLOOD PRESSURE - MUSE: NORMAL MMHG
T AXIS - MUSE: -1 DEGREES
VENTRICULAR RATE- MUSE: 98 BPM

## 2025-05-07 ENCOUNTER — LAB (OUTPATIENT)
Dept: LAB | Facility: CLINIC | Age: 25
End: 2025-05-07
Payer: COMMERCIAL

## 2025-05-07 DIAGNOSIS — G40.909 EPILEPSY (H): ICD-10-CM

## 2025-05-07 DIAGNOSIS — G40.909 NONINTRACTABLE EPILEPSY WITHOUT STATUS EPILEPTICUS, UNSPECIFIED EPILEPSY TYPE (H): ICD-10-CM

## 2025-05-07 PROCEDURE — 36415 COLL VENOUS BLD VENIPUNCTURE: CPT

## 2025-05-07 PROCEDURE — 80177 DRUG SCRN QUAN LEVETIRACETAM: CPT

## 2025-05-08 LAB — LEVETIRACETAM SERPL-MCNC: 16.8 ÂΜG/ML (ref 10–40)

## 2025-06-10 ENCOUNTER — LAB REQUISITION (OUTPATIENT)
Dept: LAB | Facility: CLINIC | Age: 25
End: 2025-06-10
Payer: COMMERCIAL

## 2025-06-10 DIAGNOSIS — Z3A.37 37 WEEKS GESTATION OF PREGNANCY: ICD-10-CM

## 2025-06-10 PROCEDURE — 87653 STREP B DNA AMP PROBE: CPT | Mod: ORL | Performed by: ADVANCED PRACTICE MIDWIFE

## 2025-06-11 LAB — GP B STREP DNA SPEC QL NAA+PROBE: POSITIVE

## 2025-06-17 ENCOUNTER — LAB (OUTPATIENT)
Dept: LAB | Facility: CLINIC | Age: 25
End: 2025-06-17
Payer: COMMERCIAL

## 2025-06-17 DIAGNOSIS — G40.909 NONINTRACTABLE EPILEPSY WITHOUT STATUS EPILEPTICUS, UNSPECIFIED EPILEPSY TYPE (H): ICD-10-CM

## 2025-06-17 LAB — LEVETIRACETAM SERPL-MCNC: 26 ÂΜG/ML (ref 10–40)

## 2025-06-17 PROCEDURE — 36415 COLL VENOUS BLD VENIPUNCTURE: CPT

## 2025-06-17 PROCEDURE — 80177 DRUG SCRN QUAN LEVETIRACETAM: CPT

## 2025-06-20 ENCOUNTER — MEDICAL CORRESPONDENCE (OUTPATIENT)
Dept: HEALTH INFORMATION MANAGEMENT | Facility: CLINIC | Age: 25
End: 2025-06-20
Payer: COMMERCIAL

## 2025-07-03 ENCOUNTER — HOSPITAL ENCOUNTER (INPATIENT)
Facility: CLINIC | Age: 25
End: 2025-07-03
Attending: OBSTETRICS & GYNECOLOGY | Admitting: OBSTETRICS & GYNECOLOGY
Payer: COMMERCIAL

## 2025-07-03 ENCOUNTER — ANESTHESIA (OUTPATIENT)
Dept: OBGYN | Facility: CLINIC | Age: 25
End: 2025-07-03
Payer: COMMERCIAL

## 2025-07-03 ENCOUNTER — ANESTHESIA EVENT (OUTPATIENT)
Dept: OBGYN | Facility: CLINIC | Age: 25
End: 2025-07-03
Payer: COMMERCIAL

## 2025-07-03 VITALS
HEART RATE: 102 BPM | WEIGHT: 210 LBS | BODY MASS INDEX: 33.75 KG/M2 | DIASTOLIC BLOOD PRESSURE: 69 MMHG | RESPIRATION RATE: 18 BRPM | SYSTOLIC BLOOD PRESSURE: 123 MMHG | TEMPERATURE: 97.4 F | HEIGHT: 66 IN

## 2025-07-03 DIAGNOSIS — Z34.90 PREGNANCY, UNSPECIFIED GESTATIONAL AGE: Primary | ICD-10-CM

## 2025-07-03 DIAGNOSIS — G40.909 SEIZURE DISORDER (H): ICD-10-CM

## 2025-07-03 LAB
ABO + RH BLD: NORMAL
BLD GP AB SCN SERPL QL: NEGATIVE
ERYTHROCYTE [DISTWIDTH] IN BLOOD BY AUTOMATED COUNT: 16.7 % (ref 10–15)
HCT VFR BLD AUTO: 30.7 % (ref 35–47)
HGB BLD-MCNC: 9.8 G/DL (ref 11.7–15.7)
HOLD SPECIMEN: NORMAL
LEVETIRACETAM SERPL-MCNC: 47.1 ÂΜG/ML (ref 10–40)
MCH RBC QN AUTO: 23.1 PG (ref 26.5–33)
MCHC RBC AUTO-ENTMCNC: 31.9 G/DL (ref 31.5–36.5)
MCV RBC AUTO: 72 FL (ref 78–100)
PLATELET # BLD AUTO: 274 10E3/UL (ref 150–450)
RBC # BLD AUTO: 4.24 10E6/UL (ref 3.8–5.2)
SPECIMEN EXP DATE BLD: NORMAL
T PALLIDUM AB SER QL: NONREACTIVE
WBC # BLD AUTO: 11.9 10E3/UL (ref 4–11)

## 2025-07-03 PROCEDURE — 36415 COLL VENOUS BLD VENIPUNCTURE: CPT | Performed by: OBSTETRICS & GYNECOLOGY

## 2025-07-03 PROCEDURE — 85014 HEMATOCRIT: CPT | Performed by: OBSTETRICS & GYNECOLOGY

## 2025-07-03 PROCEDURE — 370N000003 HC ANESTHESIA WARD SERVICE: Performed by: STUDENT IN AN ORGANIZED HEALTH CARE EDUCATION/TRAINING PROGRAM

## 2025-07-03 PROCEDURE — 250N000011 HC RX IP 250 OP 636: Performed by: OBSTETRICS & GYNECOLOGY

## 2025-07-03 PROCEDURE — 250N000011 HC RX IP 250 OP 636: Performed by: STUDENT IN AN ORGANIZED HEALTH CARE EDUCATION/TRAINING PROGRAM

## 2025-07-03 PROCEDURE — 85018 HEMOGLOBIN: CPT | Performed by: OBSTETRICS & GYNECOLOGY

## 2025-07-03 PROCEDURE — 258N000003 HC RX IP 258 OP 636: Performed by: OBSTETRICS & GYNECOLOGY

## 2025-07-03 PROCEDURE — 80177 DRUG SCRN QUAN LEVETIRACETAM: CPT | Performed by: OBSTETRICS & GYNECOLOGY

## 2025-07-03 PROCEDURE — 86900 BLOOD TYPING SEROLOGIC ABO: CPT | Performed by: OBSTETRICS & GYNECOLOGY

## 2025-07-03 PROCEDURE — 86780 TREPONEMA PALLIDUM: CPT | Performed by: OBSTETRICS & GYNECOLOGY

## 2025-07-03 PROCEDURE — 250N000013 HC RX MED GY IP 250 OP 250 PS 637: Performed by: OBSTETRICS & GYNECOLOGY

## 2025-07-03 PROCEDURE — 120N000001 HC R&B MED SURG/OB

## 2025-07-03 RX ORDER — KETOROLAC TROMETHAMINE 15 MG/ML
15 INJECTION, SOLUTION INTRAMUSCULAR; INTRAVENOUS
Status: ACTIVE | OUTPATIENT
Start: 2025-07-03

## 2025-07-03 RX ORDER — OXYTOCIN 10 [USP'U]/ML
10 INJECTION, SOLUTION INTRAMUSCULAR; INTRAVENOUS
OUTPATIENT
Start: 2025-07-03

## 2025-07-03 RX ORDER — ACETAMINOPHEN 325 MG/1
650 TABLET ORAL EVERY 4 HOURS PRN
Status: ACTIVE | OUTPATIENT
Start: 2025-07-03

## 2025-07-03 RX ORDER — ONDANSETRON 4 MG/1
4 TABLET, ORALLY DISINTEGRATING ORAL EVERY 6 HOURS PRN
Status: ACTIVE | OUTPATIENT
Start: 2025-07-03

## 2025-07-03 RX ORDER — METOCLOPRAMIDE 10 MG/1
10 TABLET ORAL EVERY 6 HOURS PRN
Status: ACTIVE | OUTPATIENT
Start: 2025-07-03

## 2025-07-03 RX ORDER — OXYTOCIN 10 [USP'U]/ML
10 INJECTION, SOLUTION INTRAMUSCULAR; INTRAVENOUS
Status: ACTIVE | OUTPATIENT
Start: 2025-07-03

## 2025-07-03 RX ORDER — OXYTOCIN/0.9 % SODIUM CHLORIDE 30/500 ML
340 PLASTIC BAG, INJECTION (ML) INTRAVENOUS CONTINUOUS PRN
Status: ACTIVE | OUTPATIENT
Start: 2025-07-03

## 2025-07-03 RX ORDER — EPHEDRINE SULFATE 50 MG/ML
5 INJECTION, SOLUTION INTRAMUSCULAR; INTRAVENOUS; SUBCUTANEOUS
Status: DISPENSED | OUTPATIENT
Start: 2025-07-03

## 2025-07-03 RX ORDER — CALCIUM CARBONATE 500 MG/1
500-1000 TABLET, CHEWABLE ORAL DAILY PRN
Status: ACTIVE | OUTPATIENT
Start: 2025-07-03

## 2025-07-03 RX ORDER — ACETAMINOPHEN 325 MG/1
975 TABLET ORAL ONCE
Status: ACTIVE | OUTPATIENT
Start: 2025-07-03

## 2025-07-03 RX ORDER — PROCHLORPERAZINE MALEATE 5 MG/1
10 TABLET ORAL EVERY 6 HOURS PRN
Status: ACTIVE | OUTPATIENT
Start: 2025-07-03

## 2025-07-03 RX ORDER — TERBUTALINE SULFATE 1 MG/ML
0.25 INJECTION SUBCUTANEOUS
Status: ACTIVE | OUTPATIENT
Start: 2025-07-03

## 2025-07-03 RX ORDER — LOPERAMIDE HYDROCHLORIDE 2 MG/1
4 CAPSULE ORAL
Status: ACTIVE | OUTPATIENT
Start: 2025-07-03

## 2025-07-03 RX ORDER — SODIUM CHLORIDE, SODIUM LACTATE, POTASSIUM CHLORIDE, CALCIUM CHLORIDE 600; 310; 30; 20 MG/100ML; MG/100ML; MG/100ML; MG/100ML
INJECTION, SOLUTION INTRAVENOUS CONTINUOUS
Status: ACTIVE | OUTPATIENT
Start: 2025-07-03

## 2025-07-03 RX ORDER — NALBUPHINE HYDROCHLORIDE 10 MG/ML
2.5-5 INJECTION INTRAMUSCULAR; INTRAVENOUS; SUBCUTANEOUS EVERY 6 HOURS PRN
Status: ACTIVE | OUTPATIENT
Start: 2025-07-03

## 2025-07-03 RX ORDER — HYDROXYZINE HYDROCHLORIDE 25 MG/1
100 TABLET, FILM COATED ORAL
Status: ACTIVE | OUTPATIENT
Start: 2025-07-03

## 2025-07-03 RX ORDER — TRANEXAMIC ACID 10 MG/ML
1 INJECTION, SOLUTION INTRAVENOUS EVERY 30 MIN PRN
Status: ACTIVE | OUTPATIENT
Start: 2025-07-03

## 2025-07-03 RX ORDER — LOPERAMIDE HYDROCHLORIDE 2 MG/1
2 CAPSULE ORAL
Status: ACTIVE | OUTPATIENT
Start: 2025-07-03

## 2025-07-03 RX ORDER — FENTANYL CITRATE 50 UG/ML
100 INJECTION, SOLUTION INTRAMUSCULAR; INTRAVENOUS
Refills: 0 | Status: ACTIVE | OUTPATIENT
Start: 2025-07-03

## 2025-07-03 RX ORDER — CARBOPROST TROMETHAMINE 250 UG/ML
250 INJECTION, SOLUTION INTRAMUSCULAR
Status: ACTIVE | OUTPATIENT
Start: 2025-07-03

## 2025-07-03 RX ORDER — METHYLERGONOVINE MALEATE 0.2 MG/ML
200 INJECTION INTRAVENOUS
Status: ACTIVE | OUTPATIENT
Start: 2025-07-03

## 2025-07-03 RX ORDER — ONDANSETRON 2 MG/ML
4 INJECTION INTRAMUSCULAR; INTRAVENOUS EVERY 6 HOURS PRN
Status: DISPENSED | OUTPATIENT
Start: 2025-07-03

## 2025-07-03 RX ORDER — ROPIVACAINE HYDROCHLORIDE 2 MG/ML
10 INJECTION, SOLUTION EPIDURAL; INFILTRATION; PERINEURAL ONCE
Status: COMPLETED | OUTPATIENT
Start: 2025-07-04 | End: 2025-07-03

## 2025-07-03 RX ORDER — MISOPROSTOL 200 UG/1
800 TABLET ORAL
Status: ACTIVE | OUTPATIENT
Start: 2025-07-03

## 2025-07-03 RX ORDER — LEVETIRACETAM 500 MG/1
1000 TABLET, FILM COATED, EXTENDED RELEASE ORAL DAILY
Status: DISPENSED | OUTPATIENT
Start: 2025-07-04

## 2025-07-03 RX ORDER — CEFAZOLIN SODIUM/WATER 2 G/20 ML
2 SYRINGE (ML) INTRAVENOUS
Status: ACTIVE | OUTPATIENT
Start: 2025-07-03

## 2025-07-03 RX ORDER — OXYTOCIN/0.9 % SODIUM CHLORIDE 30/500 ML
100-340 PLASTIC BAG, INJECTION (ML) INTRAVENOUS CONTINUOUS PRN
Status: ACTIVE | OUTPATIENT
Start: 2025-07-03

## 2025-07-03 RX ORDER — CITRIC ACID/SODIUM CITRATE 334-500MG
30 SOLUTION, ORAL ORAL
Status: ACTIVE | OUTPATIENT
Start: 2025-07-03

## 2025-07-03 RX ORDER — MISOPROSTOL 200 UG/1
400 TABLET ORAL
Status: ACTIVE | OUTPATIENT
Start: 2025-07-03

## 2025-07-03 RX ORDER — LIDOCAINE 40 MG/G
CREAM TOPICAL
Status: ACTIVE | OUTPATIENT
Start: 2025-07-03

## 2025-07-03 RX ORDER — CEFAZOLIN SODIUM/WATER 2 G/20 ML
2 SYRINGE (ML) INTRAVENOUS SEE ADMIN INSTRUCTIONS
Status: ACTIVE | OUTPATIENT
Start: 2025-07-03

## 2025-07-03 RX ORDER — METOCLOPRAMIDE HYDROCHLORIDE 5 MG/ML
10 INJECTION INTRAMUSCULAR; INTRAVENOUS EVERY 6 HOURS PRN
Status: ACTIVE | OUTPATIENT
Start: 2025-07-03

## 2025-07-03 RX ORDER — PENICILLIN G 3000000 [IU]/50ML
3 INJECTION, SOLUTION INTRAVENOUS EVERY 4 HOURS
Status: ACTIVE | OUTPATIENT
Start: 2025-07-03

## 2025-07-03 RX ORDER — IBUPROFEN 400 MG/1
800 TABLET, FILM COATED ORAL
Status: ACTIVE | OUTPATIENT
Start: 2025-07-03

## 2025-07-03 RX ORDER — OXYTOCIN/0.9 % SODIUM CHLORIDE 30/500 ML
100-340 PLASTIC BAG, INJECTION (ML) INTRAVENOUS CONTINUOUS PRN
OUTPATIENT
Start: 2025-07-03

## 2025-07-03 RX ORDER — PENICILLIN G POTASSIUM 5000000 [IU]/1
5 INJECTION, POWDER, FOR SOLUTION INTRAMUSCULAR; INTRAVENOUS ONCE
Status: ACTIVE | OUTPATIENT
Start: 2025-07-03

## 2025-07-03 RX ADMIN — SODIUM CHLORIDE, SODIUM LACTATE, POTASSIUM CHLORIDE, AND CALCIUM CHLORIDE 500 ML: .6; .31; .03; .02 INJECTION, SOLUTION INTRAVENOUS at 13:03

## 2025-07-03 RX ADMIN — ONDANSETRON 4 MG: 2 INJECTION, SOLUTION INTRAMUSCULAR; INTRAVENOUS at 22:05

## 2025-07-03 RX ADMIN — SODIUM CHLORIDE, SODIUM LACTATE, POTASSIUM CHLORIDE, AND CALCIUM CHLORIDE 500 ML: .6; .31; .03; .02 INJECTION, SOLUTION INTRAVENOUS at 23:28

## 2025-07-03 RX ADMIN — DINOPROSTONE 10 MG: 10 INSERT VAGINAL at 15:12

## 2025-07-03 RX ADMIN — ROPIVACAINE HYDROCHLORIDE 10 ML: 2 INJECTION, SOLUTION EPIDURAL; INFILTRATION at 23:47

## 2025-07-03 ASSESSMENT — ACTIVITIES OF DAILY LIVING (ADL)
ADLS_ACUITY_SCORE: 15
ADLS_ACUITY_SCORE: 41
ADLS_ACUITY_SCORE: 15

## 2025-07-03 NOTE — PLAN OF CARE
Goal Outcome Evaluation:  Patient admitted to labor and delivery room 232 at 1200 40 4/7 weeks gestation, had late decelerations in clinic today and was advised to come to hospital for possible cervical ripening.  Patient's history remarkable for epilepsy.  Takes Keppra,  states she had a seizure at 30 weeks pregnancy.    EFM and TOCO applied with patient's consent  at 1206  Paged Dr Pinto for orders.  Dr. Halvorson called unit at 1230 for an update Order received to start IV, MD wants to hydrate, keep patient NPO and observe.  Explained plan to patient and her , questions answered.  Lab here to draw blood.    1301 IV started with two attempts.  IV infusing

## 2025-07-03 NOTE — PLAN OF CARE
Request MD review fetal monitoring strip to ascertain if cervical ripening indicated.  Dr Pinto is in surgery right now, requests that RN page Dr. Davenport , page sent awaiting return call  Lab here to draw keppra level

## 2025-07-03 NOTE — H&P
OB Brief Admit H&P    No significant change in general health status based on examination of the patient, review of Nursing Admission Database and prenatal record.    Pt is a 24 year old  @ 40w4d who presented to L&D from clinic after nonreassuring  testing, performed 2/2 maternal seizure disorder.    Patient's prenatal course has been complicated by:  -obesity  -GBS+  -epilepsy on Keppra, last seizure at 30wks  -declined Tdap    Prenatal Labs:    Blood type O+  Hep Bs Ag neg  HIV neg  RPR NR  Hep C antibody negative  Rubella immune    GBS positive    Last BPP : FAIZA 14.96, MVP 4.7, BPP 8/8, cephalic, ant pl    Last growth U/S : EFW 3538g, 54%, AC 34%, cephalic, MVP/FAIZA wnl, ant pl    EFW: 3808g    LMP 2024   EFM:  baseline 145, minimal variability. +10x10 accel x 1. Late decel x 1  Capac: q5-7minutes  SVE: 1/70% per RN  Membranes:  intact    Assessment:  24 year old  @ 40w4d admitted for IOL for nonreassuring  testing. Maternal epilepsy.    Plan:  1. Admit to labor and delivery. FHT initially nonreactive with a late deceleration. Although the plan for cervical ripening was made in the office, as the FHT improved there, given the noted minimal variability and late deceleration, will hold on any induction agents and perform intrauterine resuscitation with IVF. Will obtain admission labs and keep patient NPO. Pending improvement in the FHT, will then consider reversible cervical ripening (ie cervidil). Otherwise, will need to explore alternative delivery route. At current, while writing this note, moderate variability is noted, +15x15 accel x 1 and no further decelerations, thus low suspicion for interruption in the fetal oxygenation pathway.  2. AFVSS  3. FHT Cat II, nonreactive.   4. GBS positive. Will start PCN in active labor or with ROM.   5. RH positive.   6. Epilepsy. Continue Keppra.   7. Declined Tdap.    Monitoring closely.    Loni Pinto,  MD  7/3/2025  12:31 PM

## 2025-07-03 NOTE — PLAN OF CARE
"Goal Outcome Evaluation:  Patient is getting \"a little more uncomfortable\" with contractions declines pain interventions such as warm packs.  Is utilizing birthing ball for discomfort.  Spouse is at bedside and attentive.  Raquel monitor on at 1745                            "

## 2025-07-03 NOTE — PLAN OF CARE
Goal Outcome Evaluation:  Dr. Davenport returned call, reviewed fetal monitoring strip  Order received to place cervidil and keep patient NPO for one hour after placement

## 2025-07-04 PROCEDURE — 250N000011 HC RX IP 250 OP 636: Performed by: STUDENT IN AN ORGANIZED HEALTH CARE EDUCATION/TRAINING PROGRAM

## 2025-07-04 PROCEDURE — 250N000009 HC RX 250: Mod: JW | Performed by: STUDENT IN AN ORGANIZED HEALTH CARE EDUCATION/TRAINING PROGRAM

## 2025-07-04 PROCEDURE — 120N000013 HC R&B IMCU

## 2025-07-04 PROCEDURE — 258N000003 HC RX IP 258 OP 636: Performed by: OBSTETRICS & GYNECOLOGY

## 2025-07-04 PROCEDURE — 250N000011 HC RX IP 250 OP 636: Performed by: OBSTETRICS & GYNECOLOGY

## 2025-07-04 PROCEDURE — 250N000013 HC RX MED GY IP 250 OP 250 PS 637: Performed by: OBSTETRICS & GYNECOLOGY

## 2025-07-04 PROCEDURE — 99222 1ST HOSP IP/OBS MODERATE 55: CPT | Performed by: PSYCHIATRY & NEUROLOGY

## 2025-07-04 PROCEDURE — 250N000009 HC RX 250: Performed by: OBSTETRICS & GYNECOLOGY

## 2025-07-04 PROCEDURE — 722N000001 HC LABOR CARE VAGINAL DELIVERY SINGLE

## 2025-07-04 RX ORDER — LIDOCAINE 40 MG/G
CREAM TOPICAL
Status: DISCONTINUED | OUTPATIENT
Start: 2025-07-04 | End: 2025-07-04

## 2025-07-04 RX ORDER — MISOPROSTOL 200 UG/1
400 TABLET ORAL
Status: DISCONTINUED | OUTPATIENT
Start: 2025-07-04 | End: 2025-07-05 | Stop reason: HOSPADM

## 2025-07-04 RX ORDER — SODIUM CHLORIDE, SODIUM LACTATE, POTASSIUM CHLORIDE, CALCIUM CHLORIDE 600; 310; 30; 20 MG/100ML; MG/100ML; MG/100ML; MG/100ML
INJECTION, SOLUTION INTRAVENOUS CONTINUOUS PRN
Status: DISCONTINUED | OUTPATIENT
Start: 2025-07-04 | End: 2025-07-04

## 2025-07-04 RX ORDER — AMOXICILLIN 250 MG
2 CAPSULE ORAL
Status: DISCONTINUED | OUTPATIENT
Start: 2025-07-04 | End: 2025-07-05 | Stop reason: HOSPADM

## 2025-07-04 RX ORDER — BISACODYL 10 MG
10 SUPPOSITORY, RECTAL RECTAL DAILY PRN
Status: DISCONTINUED | OUTPATIENT
Start: 2025-07-04 | End: 2025-07-05 | Stop reason: HOSPADM

## 2025-07-04 RX ORDER — ACETAMINOPHEN 325 MG/1
650 TABLET ORAL EVERY 4 HOURS PRN
Status: DISCONTINUED | OUTPATIENT
Start: 2025-07-04 | End: 2025-07-05 | Stop reason: HOSPADM

## 2025-07-04 RX ORDER — OXYTOCIN 10 [USP'U]/ML
10 INJECTION, SOLUTION INTRAMUSCULAR; INTRAVENOUS
Status: DISCONTINUED | OUTPATIENT
Start: 2025-07-04 | End: 2025-07-05 | Stop reason: HOSPADM

## 2025-07-04 RX ORDER — MISOPROSTOL 200 UG/1
800 TABLET ORAL
Status: DISCONTINUED | OUTPATIENT
Start: 2025-07-04 | End: 2025-07-05 | Stop reason: HOSPADM

## 2025-07-04 RX ORDER — LOPERAMIDE HYDROCHLORIDE 2 MG/1
2 CAPSULE ORAL
Status: DISCONTINUED | OUTPATIENT
Start: 2025-07-04 | End: 2025-07-05 | Stop reason: HOSPADM

## 2025-07-04 RX ORDER — OXYTOCIN/0.9 % SODIUM CHLORIDE 30/500 ML
340 PLASTIC BAG, INJECTION (ML) INTRAVENOUS CONTINUOUS PRN
Status: DISCONTINUED | OUTPATIENT
Start: 2025-07-04 | End: 2025-07-05 | Stop reason: HOSPADM

## 2025-07-04 RX ORDER — OXYTOCIN/0.9 % SODIUM CHLORIDE 30/500 ML
1-24 PLASTIC BAG, INJECTION (ML) INTRAVENOUS CONTINUOUS
Status: DISCONTINUED | OUTPATIENT
Start: 2025-07-04 | End: 2025-07-04

## 2025-07-04 RX ORDER — ROPIVACAINE HYDROCHLORIDE 2 MG/ML
INJECTION, SOLUTION EPIDURAL; INFILTRATION; PERINEURAL
Status: COMPLETED | OUTPATIENT
Start: 2025-07-04 | End: 2025-07-04

## 2025-07-04 RX ORDER — HYDROCORTISONE 25 MG/G
CREAM TOPICAL 3 TIMES DAILY PRN
Status: DISCONTINUED | OUTPATIENT
Start: 2025-07-04 | End: 2025-07-05 | Stop reason: HOSPADM

## 2025-07-04 RX ORDER — IBUPROFEN 400 MG/1
800 TABLET, FILM COATED ORAL EVERY 6 HOURS PRN
Status: DISCONTINUED | OUTPATIENT
Start: 2025-07-04 | End: 2025-07-05 | Stop reason: HOSPADM

## 2025-07-04 RX ORDER — TRANEXAMIC ACID 10 MG/ML
1 INJECTION, SOLUTION INTRAVENOUS EVERY 30 MIN PRN
Status: DISCONTINUED | OUTPATIENT
Start: 2025-07-04 | End: 2025-07-05 | Stop reason: HOSPADM

## 2025-07-04 RX ORDER — POLYETHYLENE GLYCOL 3350 17 G/17G
17 POWDER, FOR SOLUTION ORAL DAILY PRN
Status: DISCONTINUED | OUTPATIENT
Start: 2025-07-04 | End: 2025-07-05 | Stop reason: HOSPADM

## 2025-07-04 RX ORDER — METHYLERGONOVINE MALEATE 0.2 MG/ML
200 INJECTION INTRAVENOUS
Status: DISCONTINUED | OUTPATIENT
Start: 2025-07-04 | End: 2025-07-05 | Stop reason: HOSPADM

## 2025-07-04 RX ORDER — CARBOPROST TROMETHAMINE 250 UG/ML
250 INJECTION, SOLUTION INTRAMUSCULAR
Status: DISCONTINUED | OUTPATIENT
Start: 2025-07-04 | End: 2025-07-05 | Stop reason: HOSPADM

## 2025-07-04 RX ORDER — LOPERAMIDE HYDROCHLORIDE 2 MG/1
4 CAPSULE ORAL
Status: DISCONTINUED | OUTPATIENT
Start: 2025-07-04 | End: 2025-07-05 | Stop reason: HOSPADM

## 2025-07-04 RX ADMIN — METHYLCELLULOSE 500 MG: 500 TABLET ORAL at 11:53

## 2025-07-04 RX ADMIN — Medication: at 07:17

## 2025-07-04 RX ADMIN — Medication: at 00:33

## 2025-07-04 RX ADMIN — IBUPROFEN 800 MG: 400 TABLET ORAL at 11:53

## 2025-07-04 RX ADMIN — ACETAMINOPHEN 650 MG: 325 TABLET, FILM COATED ORAL at 19:34

## 2025-07-04 RX ADMIN — EPHEDRINE SULFATE 5 MG: 5 INJECTION INTRAVENOUS at 00:49

## 2025-07-04 RX ADMIN — PENICILLIN G 3 MILLION UNITS: 3000000 INJECTION, SOLUTION INTRAVENOUS at 05:22

## 2025-07-04 RX ADMIN — ROPIVACAINE HYDROCHLORIDE 10 ML: 2 INJECTION, SOLUTION EPIDURAL; INFILTRATION at 00:30

## 2025-07-04 RX ADMIN — EPHEDRINE SULFATE 5 MG: 5 INJECTION INTRAVENOUS at 01:02

## 2025-07-04 RX ADMIN — SODIUM CHLORIDE, POTASSIUM CHLORIDE, SODIUM LACTATE AND CALCIUM CHLORIDE: 600; 310; 30; 20 INJECTION, SOLUTION INTRAVENOUS at 02:59

## 2025-07-04 RX ADMIN — SODIUM CHLORIDE, POTASSIUM CHLORIDE, SODIUM LACTATE AND CALCIUM CHLORIDE: 600; 310; 30; 20 INJECTION, SOLUTION INTRAVENOUS at 00:55

## 2025-07-04 RX ADMIN — IBUPROFEN 800 MG: 400 TABLET ORAL at 19:34

## 2025-07-04 RX ADMIN — LEVETIRACETAM 2000 MG: 750 TABLET, EXTENDED RELEASE ORAL at 03:29

## 2025-07-04 RX ADMIN — ACETAMINOPHEN 650 MG: 325 TABLET, FILM COATED ORAL at 11:52

## 2025-07-04 RX ADMIN — Medication 340 ML/HR: at 09:48

## 2025-07-04 RX ADMIN — PENICILLIN G 3 MILLION UNITS: 3000000 INJECTION, SOLUTION INTRAVENOUS at 08:43

## 2025-07-04 RX ADMIN — PENICILLIN G POTASSIUM 5 MILLION UNITS: 5000000 POWDER, FOR SOLUTION INTRAMUSCULAR; INTRAPLEURAL; INTRATHECAL; INTRAVENOUS at 01:07

## 2025-07-04 RX ADMIN — LEVETIRACETAM 2000 MG: 500 TABLET, FILM COATED ORAL at 14:07

## 2025-07-04 ASSESSMENT — ACTIVITIES OF DAILY LIVING (ADL)
ADLS_ACUITY_SCORE: 15

## 2025-07-04 ASSESSMENT — ENCOUNTER SYMPTOMS: SEIZURES: 1

## 2025-07-04 NOTE — ANESTHESIA PREPROCEDURE EVALUATION
"Anesthesia Pre-Procedure Evaluation    Patient: Gely Naranjo   MRN: [id number] : 2000          Procedure :          Past Medical History:   Diagnosis Date    Seizure disorder (H)     Vitamin D deficiency       No past surgical history on file.   No Known Allergies   Social History     Tobacco Use    Smoking status: Never    Smokeless tobacco: Never   Substance Use Topics    Alcohol use: No      Wt Readings from Last 1 Encounters:   25 95.3 kg (210 lb)        Anesthesia Evaluation   Pt has had prior anesthetic.     No history of anesthetic complications       ROS/MED HX  ENT/Pulmonary:    (-) asthma   Neurologic:     (+)       seizures,                         Cardiovascular:    (-) PIH   METS/Exercise Tolerance:     Hematologic:     (+)    no thrombocytopenia,            Musculoskeletal:   (+)       kyphoscoliosis        GI/Hepatic:     (+) GERD,                   Renal/Genitourinary:       Endo:       Psychiatric/Substance Use:       Infectious Disease:       Malignancy:       Other:              Physical Exam  Airway  Mallampati: II  TM distance: > 3 FB  Neck ROM: full    Cardiovascular - normal exam   Dental - normal exam    Pulmonary - normal exam      Neurological   Other Findings       OUTSIDE LABS:  CBC:   Lab Results   Component Value Date    WBC 11.9 (H) 2025    WBC 11.0 2025    HGB 9.8 (L) 2025    HGB 10.1 (L) 2025    HCT 30.7 (L) 2025    HCT 31.1 (L) 2025     2025     2025     BMP:   Lab Results   Component Value Date     (L) 2025     (L) 2025    POTASSIUM 3.6 2025    POTASSIUM 3.7 2025    CHLORIDE 103 2025    CHLORIDE 101 2025    CO2 22 2025    CO2 23 2025    BUN 5.8 (L) 2025    BUN 5.9 (L) 2025    CR 0.41 (L) 2025    CR 0.44 (L) 2025     (H) 2025     (H) 2025     COAGS: No results found for: \"PTT\", \"INR\", \"FIBR\"  POC: "   Lab Results   Component Value Date    HCGS Negative 08/28/2023     HEPATIC:   Lab Results   Component Value Date    ALBUMIN 3.5 03/17/2025    PROTTOTAL 6.4 03/17/2025    ALT 41 03/17/2025    AST 35 03/17/2025    ALKPHOS 79 03/17/2025    BILITOTAL <0.2 03/17/2025     OTHER:   Lab Results   Component Value Date    A1C 5.1 11/19/2024    ANI 8.6 (L) 04/27/2025    MAG 1.5 (L) 03/17/2025    TSH 1.41 08/14/2023    T4 0.97 08/27/2020    T3 114 08/27/2020       Anesthesia Plan    ASA Status:  2       Anesthesia Type: Epidural.        Consents    Anesthesia Plan(s) and associated risks, benefits, and realistic alternatives discussed. Questions answered and patient/representative(s) expressed understanding.     - Discussed:     - Discussed with:  Patient               Postoperative Care         Comments:                   Ana Sewell MD    I have reviewed the pertinent notes and labs in the chart from the past 30 days and (re)examined the patient.  Any updates or changes from those notes are reflected in this note.    Clinically Significant Risk Factors Present on Admission                        # Anemia: based on hgb <11

## 2025-07-04 NOTE — PROVIDER NOTIFICATION
07/04/25 0639   Provider Notification   Provider Name/Title Ethel GARCIA   Method of Notification Electronic Page   Request Evaluate - Remote   Notification Reason SVE     she's an ant lip! not feeling any pressure, baby looking decent! still intact too.

## 2025-07-04 NOTE — PROVIDER NOTIFICATION
07/04/25 0250   Provider Notification   Provider Name/Title Ethel GARCIA   Method of Notification Electronic Page   Request Evaluate - Remote   Notification Reason Decels;Status Update;SVE     7 minute decel [id number], татьяна of 62. Baby returned to a new baseline of 130s, prev. baseline 150. Pt not on pit, SVE 5/80-90/-1, intact. Repositioned and IVF bolus. first dose of PCN in at 0100

## 2025-07-04 NOTE — PLAN OF CARE
Goal Outcome Evaluation:  Noted that Keppra level 47.1  reported to Dr. Davenport. Patient will attempt to contact primary neurologist, if not will place for neuro consults.   Report to Peggy Daly RN who will assume care.

## 2025-07-04 NOTE — PLAN OF CARE
Goal Outcome Evaluation:  DATE & TIME: 07/04/2025, [id number]    VSS, room air  Independent with activity  Denied any pain/discomfort  Regular diet  Straight cath 1445 per previous RN, due to void  OTHER IMPORTANT INFO:   Day of delivery. Fundus firm midline shortly after straight cath  Mild vaginal flow. Due to void. Bonding well with her baby.  Continue to monitor.

## 2025-07-04 NOTE — PLAN OF CARE
Pt. admitted from L&D  via wheelchair and transferred to bed without assist. Pt. arrived with baby and was accompanied by family and arrived with personal belongings. Report was taken from ISHMAEL Magana in L&D. VSS. Fundus is firm and midline.  Vaginal bleeding is light.  SL in and patent.  Pt. oriented to the room and call light system.

## 2025-07-04 NOTE — PROVIDER NOTIFICATION
07/03/25 4924   Provider Notification   Provider Name/Title Ethel GARCIA   Method of Notification Electronic Page   Request Evaluate - Remote   Notification Reason SVE;Status Update;Labor Status     Cervidil removed, pt 3/80/-1. She's up and walking, probably epidural soon. Baby looking good, occasional variable. Cx q2-4m

## 2025-07-04 NOTE — PROVIDER NOTIFICATION
07/03/25 1930   Provider Notification   Provider Name/Title Ethel GARCIA   Method of Notification Phone   Request Evaluate - Remote   Notification Reason Lab/Diagnostic Study     Pt's keppra level is elevated at 47.1. MD requesting pt reach out to her neurologist to see if they can advise, if pt is unable to get ahold of her neurologist then RN will place neuro consult for the AM.    Pt updated and will reach out to her neurologist.    RN also ordered pain relief medications for when pt's labor progresses: epidural, nitrous, fentanyl, tylenol, tums, and atarax.

## 2025-07-04 NOTE — PLAN OF CARE
Pt states she got up and voided large amount at 1330. Moderate amount of bleeding noted, no clots. Checked Pt's fundus- U/U and of to the left side. Bladder scanned for 400ml. Straight cath'd pt, 700ml removed from bladder, tolerated well. Pt fundus firm U/1, midline, scant bleeding noted. Will continue to monitor.

## 2025-07-04 NOTE — L&D DELIVERY NOTE
Delivery Summary    HPI: Pt is a 24 year old  @ 40w4d who presented to L&D from clinic after nonreassuring  testing, performed due to maternal seizure disorder.     Patient's prenatal course has been complicated by:  -obesity  -GBS+  -epilepsy on Keppra, last seizure at 30wks  -declined Tdap     Prenatal Labs:    Blood type O+  Hep Bs Ag neg  HIV neg  RPR NR  Hep C antibody negative  Rubella immune    GBS positive     Last BPP : FAIZA 14.96, MVP 4.7, BPP 8/8, cephalic, ant pl     Last growth U/S : EFW 3538g, 54%, AC 34%, cephalic, MVP/FAIZA wnl, ant pl     EFW: 3808g    Labor course:  1st stage: Patient was admitted to labor and delivery on the afternoon of 7/3/2025 from clinic.  During her  testing she had initially a nonreactive NST with a late deceleration.  This did improve but given her gestational age recommendation was made to proceed with induction of labor.  Cervix on admission was 1 cm / 70%.  Cervidil was placed for cervical ripening at approximately 3 PM on 7/3/2025.  Cervidil was removed at approximately 11 PM and the patient cervix was 3 cm / 80%/-1 station.  She was reina every 2 to 4 minutes and becoming uncomfortable.  Fetal heart tones were overall reassuring at that time with occasional variable decelerations.  She received an epidural for pain control at approximately 11:45 PM.  She did have 2 prolonged decelerations during the first stage of labor which resolved with position change.  She progressed to 7 to 8 cm dilated at approximately 5 AM and was in the anterior lip at approximately 6:30 AM.  Fetal heart tones were overall reassuring with occasional late and occasional variable decelerations.  I took over care of this patient at 8 AM.  At approximately 8:35 AM she was examined and found to be completely dilated.  Artificial rupture membranes was performed with meconium fluid at that time.    Second stage: Patient began pushing at 9 AM.  She pushed well  and brought the fetal vertex to a crown.  Fetal heart tones revealed decelerations with pushing with return to baseline between contractions.  Preparations were made for delivery.  Patient delivered from the direct OA position at 9:45 AM over an intact perineum.  Loose nuchal cord was present and delivered through.  Following delivery of the head the remainder the baby quickly and easily delivered and was placed in the maternal abdomen.  Baby was a female with Apgars of 9 at 1 minute 9 at 5 minutes.  NICU was present for delivery due to meconium but no resuscitation was needed.  Weight is pending at the time of this dictation.    Third stage: Examination of the perineum revealed a midline periurethral laceration which was bleeding.  This was repaired with 4-0 Vicryl in a running fashion.  There was also a second-degree perineal laceration which was repaired with 3-0 Vicryl in the usual fashion.  No other lacerations were present.  Placenta delivered intact with a three-vessel cord shortly after delivery.  Quantitative blood loss for delivery was 625 mL.  There were no complications during either the labor or delivery and mom and baby are stable in the labor suite at this time following delivery.    Adela Lau MD

## 2025-07-04 NOTE — LACTATION NOTE
Initial Lactation visit with Gely, ELENA, and baby girl. Gely reports feeding is going ok so far. Baby has had some good feedings after delivery and now is having a hard time maintaining latch and staying awake for feedings. Discussed  recovery period and feeding expectations during this time. Baby is queing. Discussed early feeding ques. Helped placed baby in cross cradle hold on left breast. Baby able to latch with LC's help. Deep latch with a few swallows noted. Discussed the feelings and looks of a deep latch. Discussed nipple shape immediately after baby unlatches. Encouraged to call with latch checks and help positioning baby.     Recommend unlimited, frequent breast feedings: At least 8 - 12 times every 24 hours. Recommended rooming in. Instructed in hand expression. Avoid pacifiers and supplementation with formula unless medically indicated. Explained benefits of holding baby skin on skin to help promote better breastfeeding outcome. Will revisit as needed.    Jennifer Brooks RN, IBCLC

## 2025-07-04 NOTE — ANESTHESIA PROCEDURE NOTES
Epidural catheter Procedure Note    Pre-Procedure   Staff -        Anesthesiologist:  Ana Sewell MD       Performed By: anesthesiologist       Location: OB       Pre-Anesthestic Checklist: patient identified, IV checked, site marked, risks and benefits discussed, informed consent, monitors and equipment checked, pre-op evaluation and at physician/surgeon's request  Timeout:       Correct Patient: Yes        Correct Procedure: Yes        Correct Site: Yes        Correct Position: Yes   Procedure Documentation  Procedure: epidural catheter         Patient Position: sitting       Patient Prep/Sterile Barriers: sterile gloves, mask, patient draped       Skin prep: Chloraprep       Local skin infiltrated with 1 mL of 1% lidocaine.        Insertion Site: L3-4. (midline approach).       Technique: LORT saline and LORT air        ISIDRO at 6.5 cm.       Needle Type: Touhy needle       Needle Gauge: 17.        Needle Length (Inches): 3.5        Catheter: 19 G.          Catheter threaded easily.         4.5 cm epidural space.         Threaded 11 cm at skin.         # of attempts: 1 and  # of redirects:     Assessment/Narrative         Paresthesias: No.       Test dose of 3 mL lidocaine 1.5% w/ 1:200,000 epinephrine at.         Test dose negative, 3 minutes after injection, for signs of intravascular, subdural, or intrathecal injection.       Insertion/Infusion Method: LORT saline and LORT air       Aspiration negative for Heme or CSF via Epidural Catheter.    Medication(s) Administered   0.2% Ropivacaine (Epidural) - EPIDURAL   10 mL - 7/4/2025 12:30:00 AM   Comments:  Pre-procedure time out completed. Patient in sitting position, the lumbar spine was prepped and draped in sterile fashion. The L3/L4 interspace was identified and local anesthetic was injected for local skin infiltration. A 17 G touhy needle was advanced to the epidural space which was confirmed with the loss of resistance technique at 6.5 cm. A catheter was  "then advanced easily into the epidural space. The catheter was left at 11 cm at the skin. Negative aspiration of blood and CSF was confirmed. A test dose of 1.5% lidocaine with 1:200,000 epinephrine was injected through the catheter and was negative for intravascular injection. The site was covered with sterile tegaderm and the catheter was secured with tape.    Orders to manage the epidural infusion have been entered and, through coordination with the nurse, we will continue to manage and monitor the patient's labor epidural.  We will continuously be available to adjust as needed throughout the entire labor and delivery process.      FOR [facility] ([facility]) ONLY:   Pain Team Contact information: please page the Pain Team Via [employer]. Search \"Pain\". During daytime hours, please page the attending first. At night please page the resident first.      "

## 2025-07-04 NOTE — PROGRESS NOTES
"[facility]  Labor Progress Note    S: Patient with epidural. Sees [facility] in [place]. Was on 2500 mg BID of keppra. Had keppra level that was high. Was told by her team that \"if she felt like her dose was too high to lower it to 4000mg\".     O:   Patient Vitals for the past 4 hrs:   BP Temp Temp src SpO2   07/04/25 0331 -- 97.6  F (36.4  C) Temporal --   07/04/25 0302 -- -- -- 98 %   07/04/25 0259 126/55 -- -- --   07/04/25 0257 -- -- -- 98 %   07/04/25 0255 111/55 -- -- --   07/04/25 0252 -- -- -- 99 %   07/04/25 0249 111/59 -- -- --   07/04/25 0247 -- -- -- 99 %   07/04/25 0245 102/57 -- -- --   07/04/25 0242 -- -- -- 99 %   07/04/25 0239 114/58 -- -- --   07/04/25 0237 -- -- -- 99 %   07/04/25 0233 115/55 -- -- --   07/04/25 0232 -- -- -- 99 %   07/04/25 0229 110/57 -- -- --   07/04/25 0228 -- 97.9  F (36.6  C) -- --   07/04/25 0227 -- -- -- 99 %   07/04/25 0223 107/59 -- -- --   07/04/25 0222 -- -- -- 99 %   07/04/25 0219 108/63 -- -- --   07/04/25 0217 -- -- -- 98 %   07/04/25 0212 -- -- -- 99 %   07/04/25 0208 119/63 -- -- --   07/04/25 0207 -- -- -- 99 %   07/04/25 0204 119/69 -- -- --   07/04/25 0202 -- -- -- 99 %   07/04/25 0200 -- 98.2  F (36.8  C) Temporal --   07/04/25 0158 106/61 -- -- --   07/04/25 0157 -- -- -- 99 %   07/04/25 0153 104/55 -- -- --   07/04/25 0152 -- -- -- 99 %   07/04/25 0149 104/50 -- -- --   07/04/25 0147 -- -- -- 97 %   07/04/25 0146 104/58 -- -- --   07/04/25 0142 -- -- -- 100 %   07/04/25 0139 (!) 146/80 -- -- --   07/04/25 0137 -- -- -- 100 %   07/04/25 0135 133/56 -- -- --   07/04/25 0132 -- -- -- 100 %   07/04/25 0129 116/58 -- -- --   07/04/25 0127 -- -- -- 98 %   07/04/25 0123 101/55 -- -- --   07/04/25 0122 -- -- -- 98 %   07/04/25 0118 96/56 -- -- --   07/04/25 0117 -- -- -- 98 %   07/04/25 0113 103/59 -- -- --   07/04/25 0112 -- -- -- 98 %   07/04/25 0107 102/55 -- -- 99 %     SVE: 7-8/90/-1    FHT: Baseline 130-150, occasional periods of minimal " "variability,otherwise moderate,  + accelerations, intermittent late decelerations and prolonged decels (0211 x 9 min, 0415 x 5 min)  Currently 150-160, no accel, min/mod variability, tiny variables.   [place]: Contractions Q 2-4 min     A/P: 24 year old  at 40w5d admitted with non reassuring tracing on  surveillance for seizure disorder.  Pregnancy complications   -obesity  -GBS+  -epilepsy on Keppra, last seizure at 30wks  -declined Tdap    Labor: progressing on own, s/p cervadil x 1   FWB: Category 2 FHT  GBS: positive, first dose of penicillin at 1 am, adequate   Rh: Positive  Epidural  Seizure disorder- keppra level high, just upped her dose a couple weeks ago  -will get neuro consult tomorrow for dose adjustment.   -per patient, had planned to decrease from 2500 BID> 2000 BID if \"felt too high\". Ordered 2000 mg BID. Appreciate neurology assist with this.     Will continue to monitor closely. Progressing on own, no pitocin augmentation.     MD Marc Díaz OB/GYN  2025, 5:05 AM    "

## 2025-07-04 NOTE — PROVIDER NOTIFICATION
07/04/25 0137   Provider Notification   Provider Name/Title Ethel GARCIA   Method of Notification Electronic Page   Request Evaluate - Remote   Notification Reason Other (Comment)     Pt states she takes 2500mg of keppra twice per day, once at noon and once at midnight. RN was under the impression that she takes 1000 once per day. Pt was able to show RN her medication bottle that states to take 2000mg twice per day. She states that she takes 2.5 pills twice per day for a total of 5000mg of keppra daily and that her doctor told her if she was symptomatic then she could self-lower her dose to 4000 mg per day.    Orders: Order 2000mg keppra twice per day for now, ok to give nighttime dose now and keep neuro consult for the morning.

## 2025-07-04 NOTE — CONSULTS
"INPATIENT NEUROLOGY CONSULTATION  [facility]     Gely Naranjo MRN# [id number]   YOB: 2000 Age: 24 year old   Date of Admission: 7/3/2025  Reason for consult: Antiseizure medication management           History of Present Illness:   Ms. Gely Naranjo is 24 year old female postpartum patient with PMH of epilepsy on Keppra, who neurology service was asked to see for recommendations regarding antiseizure medication management.     Per patient report, she was admitted to labor and delivery in the afternoon of 7/3/2025 from clinic and delivered the baby today (7/4/2025).  She still feels somewhat numb in her lower extremities after the epidural anesthesia and was not up, but denies any additional focal neurological symptoms.    She reports that her seizures started approximately 2 years ago, and she follows Dr. Elizabeth at [facility].  She used to be on 2000 mg of Keppra twice daily with good control of her seizures, but had 2 or 3 seizures during her pregnancy, which prompted her doctor to increase the dose to 2500 mg twice daily.  This does seem to be controlling her seizures well, but yesterday afternoon her Keppra level was checked and came back slightly elevated at 47.1 (not trough).  Her dose of Keppra was reduced to 2000 mg twice daily, and the neurology service was consulted for clarification on dose.    Her BMP demonstrated slightly elevated glucose of 110 and low creatinine of 0.41 in April 2025.  CBC demonstrated slightly elevated WBC of 11.9 with hemoglobin of 9.8 on 7/3/2025.  Treponema antibodies were negative.    Head CT from 11/23/2023 was normal.  It was performed after seizure.  Images were personally reviewed and independently interpreted.       Physical exam:   Vitals: Blood pressure 130/69, pulse 98, temperature 99  F (37.2  C), temperature source Temporal, resp. rate 16, height 1.676 m (5' 6\"), weight 95.3 kg (210 lb), last menstrual period " 09/22/2024, SpO2 99%, unknown if currently breastfeeding.  General: pt is in NAD, cooperative.  Neurological: alert and oriented x 3, cooperative, follows commands, able to spell world backwards, knows current and past presidents, cranial nerves II-XII are intact, strength is 5/5 in major muscle groups of all extremities, pronator drift is negative bilaterally, normal tone in upper and lower extremities, sensation to the light touch/pinprick, and vibration is intact bilaterally, finger to nose tests are intact bilaterally, gait was not tested.        Data:   I reviewed laboratory test results, external notes, imaging, as detailed in HPI.       Assessment and Plan:   24 year old female postpartum patient with epilepsy, who neurology service was consulted to give recommendations regarding Keppra management. Her neurological exam today is non-focal.      Her dose of Keppra was increased to 2500 mg twice daily recently due to concerns of ongoing seizures.  She seemed to tolerate this dose well, but her level came back slightly elevated at 47.1, and her OB/GYN providers decided to reduce the dose to 2000 mg twice daily.      In my opinion, it would be reasonable to continue a lower dose, as it was her baseline dose before the pregnancy, and might be sufficient to control her seizures, though the level that was drawn was not a trough (could be artificially high due to that reason).    I clarified with the pharmacist that the patient is on regular Keppra, Not on Keppra XR.    Would recommend:  - Continue 2000 mg of Keppra twice daily and recheck Keppra level in 4 to 5 days after dose change  - The patient states that she has upcoming appointment with her epileptologist next week (Keppra level could be checked at that time)  - Seizure precautions    Will sign off.      Thank you for the involving neurology in care of this patient.  Please do not hesitate to contact me with any questions.    Total Time: 62 minutes spent on  the date of the encounter doing chart review, history and exam, documentation and further activities per the note.    Michael Guido MD  [facility]  (Chart documentation was completed in part with Dragon voice-recognition software. Even though reviewed, some grammatical, spelling, and word errors may remain.)

## 2025-07-04 NOTE — PLAN OF CARE
Data: Gely Naranjo transferred to 428 via wheelchair at 1230. Baby transferred via mother's arms.  Action: Receiving unit notified of transfer: Yes. Patient and family notified of room change. Report given to Radha LERNER RN at 1240. Belongings sent to receiving unit. Accompanied by Registered Nurse. Oriented patient to surroundings. Call light within reach. ID bands double-checked with receiving RN. Seizure pads for bed rails applied to  in 428.  Response: Patient tolerated transfer and is stable.

## 2025-07-05 VITALS
WEIGHT: 208 LBS | TEMPERATURE: 98.5 F | OXYGEN SATURATION: 100 % | DIASTOLIC BLOOD PRESSURE: 76 MMHG | HEIGHT: 66 IN | SYSTOLIC BLOOD PRESSURE: 117 MMHG | HEART RATE: 104 BPM | RESPIRATION RATE: 16 BRPM | BODY MASS INDEX: 33.43 KG/M2

## 2025-07-05 LAB
HGB BLD-MCNC: 7.6 G/DL (ref 11.7–15.7)
MCV RBC AUTO: 72 FL (ref 78–100)

## 2025-07-05 PROCEDURE — 85018 HEMOGLOBIN: CPT | Performed by: OBSTETRICS & GYNECOLOGY

## 2025-07-05 PROCEDURE — 250N000013 HC RX MED GY IP 250 OP 250 PS 637: Performed by: OBSTETRICS & GYNECOLOGY

## 2025-07-05 PROCEDURE — 36415 COLL VENOUS BLD VENIPUNCTURE: CPT | Performed by: OBSTETRICS & GYNECOLOGY

## 2025-07-05 RX ORDER — ACETAMINOPHEN 325 MG/1
975 TABLET ORAL EVERY 6 HOURS PRN
Qty: 90 TABLET | Refills: 0 | Status: SHIPPED | OUTPATIENT
Start: 2025-07-05

## 2025-07-05 RX ORDER — IBUPROFEN 800 MG/1
800 TABLET, FILM COATED ORAL EVERY 6 HOURS PRN
Qty: 30 TABLET | Refills: 0 | Status: SHIPPED | OUTPATIENT
Start: 2025-07-05

## 2025-07-05 RX ORDER — LEVETIRACETAM 500 MG/1
2000 TABLET ORAL 2 TIMES DAILY
Status: SHIPPED
Start: 2025-07-05

## 2025-07-05 RX ADMIN — IBUPROFEN 800 MG: 400 TABLET ORAL at 08:23

## 2025-07-05 RX ADMIN — ACETAMINOPHEN 650 MG: 325 TABLET, FILM COATED ORAL at 02:53

## 2025-07-05 RX ADMIN — ACETAMINOPHEN 650 MG: 325 TABLET, FILM COATED ORAL at 08:23

## 2025-07-05 RX ADMIN — METHYLCELLULOSE 1000 MG: 500 TABLET ORAL at 08:24

## 2025-07-05 RX ADMIN — LEVETIRACETAM 2000 MG: 500 TABLET, FILM COATED ORAL at 02:57

## 2025-07-05 RX ADMIN — IBUPROFEN 800 MG: 400 TABLET ORAL at 02:53

## 2025-07-05 ASSESSMENT — ACTIVITIES OF DAILY LIVING (ADL)
ADLS_ACUITY_SCORE: 15

## 2025-07-05 NOTE — PLAN OF CARE
Feels well. Vitals stable.  Hgb 7.6 Dr Lau aware.  Voiding in good amounts. Working on breast and bottle feeding. Ready for discharge home with baby.

## 2025-07-05 NOTE — ANESTHESIA POSTPROCEDURE EVALUATION
Patient: Gely Naranjo    Procedure: * No procedures listed *       Anesthesia Type:  Epidural    Note:     Postop Pain Control:    PONV:    Neuro/Psych:    Airway/Respiratory:    CV/Hemodynamics:    Other NRE:    DID A NON-ROUTINE EVENT OCCUR?     Event details/Postop Comments:  Patient discharged prior to evaluation - no apparent complications.           Last vitals:  Vitals:    07/05/25 0330 07/05/25 0600 07/05/25 0818   BP: 117/77  117/76   Pulse: 106  104   Resp: 16  16   Temp:  36.9  C (98.4  F) 36.9  C (98.5  F)   SpO2:          Electronically Signed By: RUTH CHAVEZ MD  July 5, 2025  6:56 PM

## 2025-07-05 NOTE — PLAN OF CARE
Goal Outcome Evaluation:      Plan of Care Reviewed With: patient    Overall Patient Progress: improvingOverall Patient Progress: improving             Vital signs stable. Postpartum assessment WDL. Pain controlled with tylenol and ibuprofen. Patient voiding without difficulty. Breastfeeding well. Patient and infant bonding well. Will continue with current plan of care.

## 2025-07-05 NOTE — DISCHARGE INSTRUCTIONS
Warning Signs after Having a Baby    Keep this paper on your fridge or somewhere else where you can see it.    Call your provider if you have any of these symptoms up to 12 weeks after having your baby.    Thoughts of hurting yourself or your baby  Pain in your chest or trouble breathing  Severe headache not helped by pain medicine  Eyesight concerns (blurry vision, seeing spots or flashes of light, other changes to eyesight)  Fainting, shaking or other signs of a seizure    Call 9-1-1 if you feel that it is an emergency.     The symptoms below can happen to anyone after giving birth. They can be very serious. Call your provider if you have any of these warning signs.    My provider s phone number: _______________________    Losing too much blood (hemorrhage)    Call your provider if you soak through a pad in less than an hour or pass blood clots bigger than a golf ball. These may be signs that you are bleeding too much.    Blood clots in the legs or lungs    After you give birth, your body naturally clots its blood to help prevent blood loss. Sometimes this increased clotting can happen in other areas of the body, like the legs or lungs. This can block your blood flow and be very dangerous.     Call your provider if you:  Have a red, swollen spot on the back of your leg that is warm or painful when you touch it.   Are coughing up blood.     Infection    Call your provider if you have any of these symptoms:  Fever of 100.4 F (38 C) or higher.  Pain or redness around your stitches if you had an incision.   Any yellow, white, or green fluid coming from places where you had stitches or surgery.    Mood Problems (postpartum depression)    Many people feel sad or have mood changes after having a baby. But for some people, these mood swings are worse.     Call your provider right away if you feel so anxious or nervous that you can't care for yourself or your baby.    Preeclampsia (high blood pressure)    Even if you  "didn't have high blood pressure when you were pregnant, you are at risk for the high blood pressure disease called preeclampsia. This risk can last up to 12 weeks after giving birth.     Call your provider if you have:   Pain on your right side under your rib cage  Sudden swelling in the hands and face    Remember: You know your body. If something doesn't feel right, get medical help.     For informational purposes only. Not to replace the advice of your health care provider. Copyright 2020 [employer] [facility]. All rights reserved. Clinically reviewed by Luci Matthews, RNC-OB, MSN. medidametrics [id number] - Rev .    Postpartum Care at Home With Your Baby: Care Instructions  Overview     After childbirth (postpartum period), your body goes through many changes as you recover. In these weeks after delivery, try to take good care of yourself. Get rest whenever you can and accept help from others.  It may take 4 to 6 weeks to feel like yourself again, and possibly longer if you had a  birth. You may feel sore or very tired as you recover. After delivery, you may continue to have contractions as the uterus returns to the size it was before your pregnancy. You will also have some vaginal bleeding. And you may have pain around the vagina as you heal. Several days after delivery you may also have pain and swelling in your breasts as they fill with milk. There are things you can do at home to help ease these discomforts.  After childbirth, it's common to feel emotional. You may feel irritable, cry easily, and feel happy one minute and sad the next. This is called the \"baby blues.\" Hormone changes are one cause of these emotional changes. These feelings usually get better within a couple of weeks. If they don't, talk to your doctor or midwife.  In the first couple of weeks after you give birth, your doctor or midwife may want to check in with you and make a plan for follow-up care. You will likely have a " complete postpartum visit in the first 3 months after delivery. At that time, your doctor or midwife will check on your recovery and see how you're doing. But if you have questions or concerns before then, you can always call your doctor or midwife.  Follow-up care is a key part of your treatment and safety. Be sure to make and go to all appointments, and call your doctor if you are having problems. It's also a good idea to know your test results and keep a list of the medicines you take.  How can you care for yourself at home?  Taking care of your body  Use pads instead of tampons for bleeding. After birth, you will have bloody vaginal discharge. You may also pass some blood clots that shouldn't be bigger than an egg. Over the next 6 weeks or so, your bleeding should decrease a little every day and slowly change to a pinkish and then whitish discharge.  For cramps or mild pain, try an over-the-counter pain medicine, such as acetaminophen (Tylenol) or ibuprofen (Advil, Motrin). Read and follow all instructions on the label.  To ease pain around the vagina or from hemorrhoids:  Put ice or a cold pack on the area for 10 to 20 minutes at a time. Put a thin cloth between the ice and your skin.  Try sitting in a few inches of warm water (sitz bath) when you can or after bowel movements.  Clean yourself with a gentle squeeze of warm water from a bottle instead of wiping with toilet paper.  Use witch hazel or hemorrhoid pads (such as Tucks).  Try using a cold compress for sore and swollen breasts. And wear a supportive bra that fits.  Ease constipation by drinking plenty of fluids and eating high-fiber foods. Ask your doctor or midwife about over-the-counter stool softeners.  Activity  Rest when you can.  Ask for help from family or friends when you need it.  If you can, have another adult in your home for at least 2 or 3 days after birth.  When you feel ready, try to get some exercise every day. For many people, walking  is a good choice. Don't do any heavy exercise until your doctor or midwife says it's okay.  Ask your doctor or midwife when it is okay to have vaginal sex.  If you don't want to get pregnant, talk to your doctor or midwife about birth control options. You can get pregnant even before your period returns. Also, you can get pregnant while you are breastfeeding.  Talk to your doctor or midwife if you want to get pregnant again. They can talk to you about when it is safe.  Emotional health  It's normal to have some sadness, anxiety, and mood swings after delivery. It may help to talk with a trusted friend or family member. You can also call the Maternal Mental Health Hotline at 2-102-[facility] (1-856.300.2251) for support. If these mood changes last more than a couple of weeks, talk to your doctor or midwife.  When should you call for help?  Share this information with your partner, family, or a friend. They can help you watch for warning signs.  Call 911  anytime you think you may need emergency care. For example, call if:    You feel you cannot stop from hurting yourself, your baby, or someone else.     You passed out (lost consciousness).     You have chest pain, are short of breath, or cough up blood.     You have a seizure.   Where to get help 24 hours a day, 7 days a week   If you or someone you know talks about suicide, self-harm, a mental health crisis, a substance use crisis, or any other kind of emotional distress, get help right away. You can:    Call the Suicide and Crisis Lifeline at 058.     Call 0-479-873-TALK (1-621.395.5236).     Text HOME to 533895 to access the Crisis Text Line.   Consider saving these numbers in your phone.  Go to [email or web address] for more information or to chat online.  Call your doctor or midwife now or seek immediate medical care if:    You have signs of hemorrhage (too much bleeding), such as:  Heavy vaginal bleeding. This means that you are soaking through one or more pads in an  "hour. Or you pass blood clots bigger than an egg.  Feeling dizzy or lightheaded, or you feel like you may faint.  Feeling so tired or weak that you cannot do your usual activities.  A fast or irregular heartbeat.  New or worse belly pain.     You have signs of infection, such as:  A fever.  Increased pain, swelling, warmth, or redness from an incision or wound.  Frequent or painful urination or blood in your urine.  Vaginal discharge that smells bad.  New or worse belly pain.     You have symptoms of a blood clot in your leg (called a deep vein thrombosis), such as:  Pain in the calf, back of the knee, thigh, or groin.  Swelling in the leg or groin.  A color change on the leg or groin. The skin may be reddish or purplish, depending on your usual skin color.     You have signs of preeclampsia, such as:  Sudden swelling of your face, hands, or feet.  New vision problems (such as dimness, blurring, or seeing spots).  A severe headache.     You have signs of heart failure, such as:  New or increased shortness of breath.  New or worse swelling in your legs, ankles, or feet.  Sudden weight gain, such as more than 2 to 3 pounds in a day or 5 pounds in a week.  Feeling so tired or weak that you cannot do your usual activities.     You had spinal or epidural pain relief and have:  New or worse back pain.  Increased pain, swelling, warmth, or redness at the injection site.  Tingling, weakness, or numbness in your legs or groin.   Watch closely for changes in your health, and be sure to contact your doctor or midwife if:    Your vaginal bleeding isn't decreasing.     You feel sad, anxious, or hopeless for more than a few days.     You are having problems with your breasts or breastfeeding.   Where can you learn more?  Go to https://www.healthwise.net/patiented  Enter Z768 in the search box to learn more about \"Postpartum Care at Home With Your Baby: Care Instructions.\"  Current as of: April 30, 2024  Content Version: 14.5    " [id number] [employer].   Care instructions adapted under license by your healthcare professional. If you have questions about a medical condition or this instruction, always ask your healthcare professional. [employer] disclaims any warranty or liability for your use of this information.

## 2025-07-05 NOTE — PROGRESS NOTES
"OB Post-partum Note  PPD# 1    S:  Patient doing well.  Pain controlled.  Voiding.  Bleeding is normal.  Breast feeding.  Desires discharge home.  Denies symptoms of anemia.    O:  /76 (BP Location: Right arm)   Pulse 104   Temp 98.5  F (36.9  C) (Oral)   Resp 16   Ht 1.676 m (5' 6\")   Wt 95.3 kg (210 lb)   LMP 2024   SpO2 100%   Breastfeeding yes   BMI 33.89 kg/m    Gen- A&O, NAD  Abd- Non-tender, fundus non tender and firm   Ext- non-tender, no calf pain    Hemoglobin   Date Value Ref Range Status   2025 - pre-del 9.8 (L) 11.7 - 15.7 g/dL Final   Hgb today 7.6  O POS  Rubella Immune    A/P: 24 year old  PPD# 1 s/p     1.  Routine post-partum cares  2.  Analgesia tylenol and ibuprofen  3.  Discharge today.  4.  The plan of care was discussed with the patient.  She expressed understanding and agreement  5.  RTC in 6 weeks  6.  Indications to call or return were discussed. Post partum instructions were given  7. Declined Tdap  8. Seizure disorder - appreciate neurology consult. Plan to continue keppra at 2000 mg PO BID and follow up with neurology next week as scheduled.  9. Acute on chronic anemia - no symptoms.  Declines IV Fe.  Plans to take Fe daily at home - has at home.  Discussed stool softeners if needed.      Adela Lau MD  2025  9:12 AM  "

## 2025-07-12 ENCOUNTER — HEALTH MAINTENANCE LETTER (OUTPATIENT)
Age: 25
End: 2025-07-12

## 2025-07-14 ENCOUNTER — HOSPITAL ENCOUNTER (EMERGENCY)
Facility: CLINIC | Age: 25
Discharge: HOME | End: 2025-07-14
Attending: EMERGENCY MEDICINE | Admitting: EMERGENCY MEDICINE
Payer: COMMERCIAL

## 2025-07-14 ENCOUNTER — APPOINTMENT (OUTPATIENT)
Dept: CT IMAGING | Facility: CLINIC | Age: 25
End: 2025-07-14
Attending: EMERGENCY MEDICINE
Payer: COMMERCIAL

## 2025-07-14 VITALS
OXYGEN SATURATION: 100 % | SYSTOLIC BLOOD PRESSURE: 133 MMHG | TEMPERATURE: 98.2 F | BODY MASS INDEX: 32.53 KG/M2 | RESPIRATION RATE: 18 BRPM | WEIGHT: 202.38 LBS | DIASTOLIC BLOOD PRESSURE: 73 MMHG | HEIGHT: 66 IN

## 2025-07-14 DIAGNOSIS — R10.9 FLANK PAIN: ICD-10-CM

## 2025-07-14 DIAGNOSIS — R10.12 LUQ ABDOMINAL PAIN: ICD-10-CM

## 2025-07-14 LAB
ALBUMIN SERPL BCG-MCNC: 3.8 G/DL (ref 3.5–5.2)
ALBUMIN UR-MCNC: NEGATIVE MG/DL
ALP SERPL-CCNC: 108 U/L (ref 40–150)
ALT SERPL W P-5'-P-CCNC: 17 U/L (ref 0–50)
ANION GAP SERPL CALCULATED.3IONS-SCNC: 11 MMOL/L (ref 7–15)
APPEARANCE UR: CLEAR
AST SERPL W P-5'-P-CCNC: 17 U/L (ref 0–45)
BASOPHILS # BLD AUTO: 0 10E3/UL (ref 0–0.2)
BASOPHILS NFR BLD AUTO: 0 %
BILIRUB SERPL-MCNC: 0.2 MG/DL
BILIRUB UR QL STRIP: NEGATIVE
BUN SERPL-MCNC: 16.5 MG/DL (ref 6–20)
CALCIUM SERPL-MCNC: 8.7 MG/DL (ref 8.8–10.4)
CHLORIDE SERPL-SCNC: 107 MMOL/L (ref 98–107)
COLOR UR AUTO: ABNORMAL
CREAT SERPL-MCNC: 0.58 MG/DL (ref 0.51–0.95)
EGFRCR SERPLBLD CKD-EPI 2021: >90 ML/MIN/1.73M2
EOSINOPHIL # BLD AUTO: 0.2 10E3/UL (ref 0–0.7)
EOSINOPHIL NFR BLD AUTO: 2 %
ERYTHROCYTE [DISTWIDTH] IN BLOOD BY AUTOMATED COUNT: 17.5 % (ref 10–15)
GLUCOSE SERPL-MCNC: 103 MG/DL (ref 70–99)
GLUCOSE UR STRIP-MCNC: NEGATIVE MG/DL
HCG UR QL: POSITIVE
HCO3 SERPL-SCNC: 21 MMOL/L (ref 22–29)
HCT VFR BLD AUTO: 30.6 % (ref 35–47)
HGB BLD-MCNC: 9.3 G/DL (ref 11.7–15.7)
HGB UR QL STRIP: ABNORMAL
HOLD SPECIMEN: NORMAL
HOLD SPECIMEN: NORMAL
IMM GRANULOCYTES # BLD: 0.1 10E3/UL
IMM GRANULOCYTES NFR BLD: 1 %
KETONES UR STRIP-MCNC: NEGATIVE MG/DL
LEUKOCYTE ESTERASE UR QL STRIP: ABNORMAL
LIPASE SERPL-CCNC: 22 U/L (ref 13–60)
LYMPHOCYTES # BLD AUTO: 1.8 10E3/UL (ref 0.8–5.3)
LYMPHOCYTES NFR BLD AUTO: 18 %
MCH RBC QN AUTO: 22 PG (ref 26.5–33)
MCHC RBC AUTO-ENTMCNC: 30.4 G/DL (ref 31.5–36.5)
MCV RBC AUTO: 73 FL (ref 78–100)
MONOCYTES # BLD AUTO: 0.7 10E3/UL (ref 0–1.3)
MONOCYTES NFR BLD AUTO: 7 %
MUCOUS THREADS #/AREA URNS LPF: PRESENT /LPF
NEUTROPHILS # BLD AUTO: 7.1 10E3/UL (ref 1.6–8.3)
NEUTROPHILS NFR BLD AUTO: 72 %
NITRATE UR QL: NEGATIVE
NRBC # BLD AUTO: 0 10E3/UL
NRBC BLD AUTO-RTO: 0 /100
PH UR STRIP: 6.5 [PH] (ref 5–7)
PLATELET # BLD AUTO: 406 10E3/UL (ref 150–450)
POTASSIUM SERPL-SCNC: 3.8 MMOL/L (ref 3.4–5.3)
PROT SERPL-MCNC: 6.6 G/DL (ref 6.4–8.3)
RBC # BLD AUTO: 4.22 10E6/UL (ref 3.8–5.2)
RBC URINE: 13 /HPF
SODIUM SERPL-SCNC: 139 MMOL/L (ref 135–145)
SP GR UR STRIP: 1.02 (ref 1–1.03)
UROBILINOGEN UR STRIP-MCNC: NORMAL MG/DL
WBC # BLD AUTO: 10 10E3/UL (ref 4–11)
WBC URINE: 9 /HPF

## 2025-07-14 PROCEDURE — 99285 EMERGENCY DEPT VISIT HI MDM: CPT | Mod: 25 | Performed by: EMERGENCY MEDICINE

## 2025-07-14 PROCEDURE — 81025 URINE PREGNANCY TEST: CPT | Performed by: EMERGENCY MEDICINE

## 2025-07-14 PROCEDURE — 250N000009 HC RX 250: Performed by: EMERGENCY MEDICINE

## 2025-07-14 PROCEDURE — 250N000011 HC RX IP 250 OP 636: Performed by: EMERGENCY MEDICINE

## 2025-07-14 PROCEDURE — 36415 COLL VENOUS BLD VENIPUNCTURE: CPT | Performed by: EMERGENCY MEDICINE

## 2025-07-14 PROCEDURE — 80053 COMPREHEN METABOLIC PANEL: CPT | Performed by: EMERGENCY MEDICINE

## 2025-07-14 PROCEDURE — 85004 AUTOMATED DIFF WBC COUNT: CPT | Performed by: EMERGENCY MEDICINE

## 2025-07-14 PROCEDURE — 74177 CT ABD & PELVIS W/CONTRAST: CPT

## 2025-07-14 PROCEDURE — 81003 URINALYSIS AUTO W/O SCOPE: CPT | Performed by: EMERGENCY MEDICINE

## 2025-07-14 PROCEDURE — 83690 ASSAY OF LIPASE: CPT | Performed by: EMERGENCY MEDICINE

## 2025-07-14 RX ORDER — IOPAMIDOL 755 MG/ML
500 INJECTION, SOLUTION INTRAVASCULAR ONCE
Status: COMPLETED | OUTPATIENT
Start: 2025-07-14 | End: 2025-07-14

## 2025-07-14 RX ADMIN — SODIUM CHLORIDE 96 ML: 9 INJECTION, SOLUTION INTRAVENOUS at 03:41

## 2025-07-14 RX ADMIN — IOPAMIDOL 100 ML: 755 INJECTION, SOLUTION INTRAVENOUS at 03:41

## 2025-07-14 ASSESSMENT — COLUMBIA-SUICIDE SEVERITY RATING SCALE - C-SSRS
2. HAVE YOU ACTUALLY HAD ANY THOUGHTS OF KILLING YOURSELF IN THE PAST MONTH?: NO
1. IN THE PAST MONTH, HAVE YOU WISHED YOU WERE DEAD OR WISHED YOU COULD GO TO SLEEP AND NOT WAKE UP?: NO
6. HAVE YOU EVER DONE ANYTHING, STARTED TO DO ANYTHING, OR PREPARED TO DO ANYTHING TO END YOUR LIFE?: NO

## 2025-07-14 ASSESSMENT — ACTIVITIES OF DAILY LIVING (ADL)
ADLS_ACUITY_SCORE: 41
ADLS_ACUITY_SCORE: 41

## 2025-07-14 NOTE — ED TRIAGE NOTES
Pt woke an hour ago with left sided abd pain that radiates into her back. Pt had a vaginal deliver on 7/4. Pt is breast feeding

## 2025-07-14 NOTE — ED PROVIDER NOTES
"    History     Chief Complaint:  Flank pain       HPI   Gely Naranjo is a 24 year old female G2, P2 status post  7/3/2025 who presents relation of left upper quadrant and flank pain.  Patient states that she was lying down at approximately 1 AM this morning when she had sudden onset left lower lateral chest wall pain and left upper quadrant pain, rating to the back.  The pain was constant and persistent but is improved and is currently mild.  She did note that initially the pain felt pleuritic, but no longer does.  She denies fevers, chills, nausea, vomiting, dysuria, frequency, or any other concerns.  She has never had pain like this before.    Independent Historian:   None    Review of External Notes:   Reviewed 7/3/2025 hospitalization regarding OB/GYN care delivery    ROS:  Review of Systems    Allergies:  No Known Allergies     Medications:    acetaminophen (TYLENOL) 325 MG tablet  ibuprofen (ADVIL/MOTRIN) 800 MG tablet  levETIRAcetam (KEPPRA) 500 MG tablet  vitamin D3 (CHOLECALCIFEROL) 50 mcg (2000 units) tablet        Past History:    Past Medical History:   Diagnosis Date    Seizure disorder (H)     Vitamin D deficiency          Physical Exam     Patient Vitals for the past 24 hrs:  Vitals:    25 0243   BP: 133/73   Resp: 18   Temp: 98.2  F (36.8  C)   TempSrc: Oral   SpO2: 100%   Weight: 91.8 kg (202 lb 6.1 oz)   Height: 1.676 m (5' 6\")         Physical Exam  Constitutional: Alert, attentive  HENT:    Nose: Nose normal.    Mouth/Throat: Oropharynx is clear, mucous membranes are moist   Eyes: EOM are normal.   CV: regular rate and rhythm; no murmurs, rubs or gallups  Chest: Effort normal and breath sounds normal.   GI:  There is mild LUQ tenderness. No distension. Normal bowel sounds  MSK: Normal range of motion.   Neurological: Alert, attentive  Skin: Skin is warm and dry.      Emergency Department Course       Results for orders placed or performed during the hospital encounter of 25   CT " Chest (PE) Abdomen Pelvis w Contrast     Status: None    Narrative    EXAM: CT CHEST PE ABDOMEN PELVIS W CONTRAST  LOCATION: [facility]  DATE: 7/14/2025    INDICATION: LUQ pleuritic pain, post delivery  COMPARISON: Obstetric ultrasound 4/27/2025. CT of the abdomen and pelvis 5/17/2011.  TECHNIQUE: CT chest pulmonary angiogram and routine CT abdomen pelvis with IV contrast. Arterial phase through the chest and venous phase through the abdomen and pelvis. Multiplanar reformats and MIP reconstructions were performed. Dose reduction   techniques were used.   CONTRAST: 100mL Isovue 370    FINDINGS:  ANGIOGRAM CHEST: No pulmonary embolus. No aortic dissection or aneurysm.     LUNGS AND PLEURA: Normal.    MEDIASTINUM/AXILLAE: Normal.    CORONARY ARTERY CALCIFICATION: None.    HEPATOBILIARY: Normal.    PANCREAS: Normal.    SPLEEN: Normal.    ADRENAL GLANDS: Normal.    KIDNEYS/BLADDER: Normal.    BOWEL: Normal. No obstruction or inflammation. Normal appendix.    LYMPH NODES: Normal.    VASCULATURE: Normal.    PELVIC ORGANS: Enlarged postpartum uterus.    MUSCULOSKELETAL: Normal.      Impression    IMPRESSION:  No acute process identified in the chest, abdomen or pelvis.   UA with Microscopic reflex to Culture     Status: Abnormal    Specimen: Urine, Clean Catch   Result Value Ref Range    Color Urine Light Yellow Colorless, Straw, Light Yellow, Yellow    Appearance Urine Clear Clear    Glucose Urine Negative Negative mg/dL    Bilirubin Urine Negative Negative    Ketones Urine Negative Negative mg/dL    Specific Gravity Urine 1.022 1.003 - 1.035    Blood Urine Moderate (A) Negative    pH Urine 6.5 5.0 - 7.0    Protein Albumin Urine Negative Negative mg/dL    Urobilinogen Urine Normal Normal mg/dL    Nitrite Urine Negative Negative    Leukocyte Esterase Urine Small (A) Negative    Mucus Urine Present (A) None Seen /LPF    RBC Urine 13 (H) <=2 /HPF    WBC Urine 9 (H) <=5 /HPF    Narrative    Urine Culture  not indicated   HCG qualitative urine     Status: Abnormal   Result Value Ref Range    hCG Urine Qualitative Positive (A) Negative   [place] Draw *Canceled*     Status: None ()    Narrative    The following orders were created for panel order [place] Draw.  Procedure                               Abnormality         Status                     ---------                               -----------         ------                       Please view results for these tests on the individual orders.   Comprehensive metabolic panel     Status: Abnormal   Result Value Ref Range    Sodium 139 135 - 145 mmol/L    Potassium 3.8 3.4 - 5.3 mmol/L    Carbon Dioxide (CO2) 21 (L) 22 - 29 mmol/L    Anion Gap 11 7 - 15 mmol/L    Urea Nitrogen 16.5 6.0 - 20.0 mg/dL    Creatinine 0.58 0.51 - 0.95 mg/dL    GFR Estimate >90 >60 mL/min/1.73m2    Calcium 8.7 (L) 8.8 - 10.4 mg/dL    Chloride 107 98 - 107 mmol/L    Glucose 103 (H) 70 - 99 mg/dL    Alkaline Phosphatase 108 40 - 150 U/L    AST 17 0 - 45 U/L    ALT 17 0 - 50 U/L    Protein Total 6.6 6.4 - 8.3 g/dL    Albumin 3.8 3.5 - 5.2 g/dL    Bilirubin Total 0.2 <=1.2 mg/dL   Lipase     Status: Normal   Result Value Ref Range    Lipase 22 13 - 60 U/L   [place] Draw *Canceled*     Status: None ()    Narrative    The following orders were created for panel order [place] Draw.  Procedure                               Abnormality         Status                     ---------                               -----------         ------                       Please view results for these tests on the individual orders.   [place] Draw     Status: None    Narrative    The following orders were created for panel order [place] Draw.  Procedure                               Abnormality         Status                     ---------                               -----------         ------                     Extra Blue Top Tube[[id number]]                             Final result               Extra Red Top  Tube[[id number]]                              Final result                 Please view results for these tests on the individual orders.   CBC with platelets and differential     Status: Abnormal   Result Value Ref Range    WBC Count 10.0 4.0 - 11.0 10e3/uL    RBC Count 4.22 3.80 - 5.20 10e6/uL    Hemoglobin 9.3 (L) 11.7 - 15.7 g/dL    Hematocrit 30.6 (L) 35.0 - 47.0 %    MCV 73 (L) 78 - 100 fL    MCH 22.0 (L) 26.5 - 33.0 pg    MCHC 30.4 (L) 31.5 - 36.5 g/dL    RDW 17.5 (H) 10.0 - 15.0 %    Platelet Count 406 150 - 450 10e3/uL    % Neutrophils 72 %    % Lymphocytes 18 %    % Monocytes 7 %    % Eosinophils 2 %    % Basophils 0 %    % Immature Granulocytes 1 %    NRBCs per 100 WBC 0 <1 /100    Absolute Neutrophils 7.1 1.6 - 8.3 10e3/uL    Absolute Lymphocytes 1.8 0.8 - 5.3 10e3/uL    Absolute Monocytes 0.7 0.0 - 1.3 10e3/uL    Absolute Eosinophils 0.2 0.0 - 0.7 10e3/uL    Absolute Basophils 0.0 0.0 - 0.2 10e3/uL    Absolute Immature Granulocytes 0.1 <=0.4 10e3/uL    Absolute NRBCs 0.0 10e3/uL   Extra Blue Top Tube     Status: None   Result Value Ref Range    Hold Specimen JIC    Extra Red Top Tube     Status: None   Result Value Ref Range    Hold Specimen JIC    CBC with differential     Status: Abnormal    Narrative    The following orders were created for panel order CBC with differential.  Procedure                               Abnormality         Status                     ---------                               -----------         ------                     CBC with platelets and ...[[id number]]  Abnormal            Final result                 Please view results for these tests on the individual orders.       Emergency Department Course & Assessments:             Interventions:  Medications   iopamidol (ISOVUE-370) solution 500 mL (100 mLs Intravenous $Given 7/14/25 0341)   sodium chloride 0.9 % bag for CT scan flush (96 mLs Intravenous $Given 7/14/25 0341)       Disposition:  The patient was discharged to  home.     Impression & Plan      [facility]  CT for PE was ordered because the patient is high risk for pulmonary embolism.     Medical Decision Making:  This a pleasant 24-year-old female G2, P2 with recent  7/3/2025 presents for evaluation of left lower quadrant and left flank pain.  Given recent , the patient is high risk for PE.  Therefore, CT PE chest Abdo pelvis was performed.  Fortunately there is no acute abnormality noted including PE or intra-abdominal or thoracic process.  She is not having symptoms of cystitis and UA is reassuring.  The remainder the workup is reassuring.  On recheck, symptoms are now resolved.  I explained the unclear nature of his symptoms.  Will plan for primary care follow-up for recheck in 3 to 5 days and return precaution for worse pain, difficulty breathing, or any other concerns.      Diagnosis:  Visit Diagnosis, Associated Orders, and Comments     ICD-10-CM    1. Flank pain  R10.9       2. LUQ abdominal pain  R10.12              Heri Sargent MD  25 0434

## 2025-07-14 NOTE — DISCHARGE INSTRUCTIONS
It was a pleasure taking care of you today. I hope you feel much better soon.  Your evaluation was reassuring.  Please follow-up with your primary care doctor in 3-5 days.  Return immediately for worse pain, fever, or any other concerns.

## 2025-07-16 ENCOUNTER — PATIENT OUTREACH (OUTPATIENT)
Dept: CARE COORDINATION | Facility: CLINIC | Age: 25
End: 2025-07-16
Payer: COMMERCIAL

## 2025-07-16 NOTE — PROGRESS NOTES
Clinic Care Coordination Contact  Community Health Worker Initial Outreach    CHW Initial Information Gathering:  Referral Source: ED Follow-Up  CHW Additional Questions  If ED/Hospital discharge, follow-up appointment scheduled as recommended?: No  MyChart active?: Yes    Patient accepts CC: No, declined. No additional support is needed at this time. Patient will be sent Care Coordination introduction letter for future reference.     Paris Perez [facility]  Care Coordination         Keep wound dry for at least 24 hours  Steri strips will fall off on their own  Then apply a thin layer of bacitracin at least once daily until wound heals

## 2025-07-16 NOTE — LETTER
Gely Naranjo  [id number]   [place] [facility] [place] [address]    Dear Gely Naranjo,      I am a team member within the [facility] with [facility]. I recently spoke to you to ensure you were doing well following a recent visit within our health system. I also wanted to take this chance to introduce Clinic Care Coordination.     Below is a description of Clinic Care Coordination and how this team can further assist you:       The Clinic Care Coordination team is made up of a Registered Nurse, , Financial Resource Worker, and a Community Health Worker who understand and can help navigate the health care system. The goal of clinic care coordination is to help you manage your health, improve access to care, and achieve optimal health outcomes. They work alongside your provider to assist you in determining your health and social needs, obtain health care and community resources, and provide you with necessary information and education. Clinic Care Coordination can work with you through any barriers and develop a care plan that helps coordinate and strengthen the relationship between you and your care team.    If you wish to connect with the Clinic Care Coordination Team, please let your [facility] Primary Care Provider or Clinic Care Team know and they can place a referral. The Clinic Care Coordination team will then reach out by phone to further support you.    We are focused on providing you with the highest-quality healthcare experience possible.    Sincerely,   Your care team with [facility]'s [address] [place] [address] ([phone]).    Paris Perez, [facility]  Care Coordination

## 2025-08-13 DIAGNOSIS — Z79.899 OTHER LONG TERM (CURRENT) DRUG THERAPY: Primary | ICD-10-CM

## 2025-08-19 ENCOUNTER — LAB (OUTPATIENT)
Dept: LAB | Facility: CLINIC | Age: 25
End: 2025-08-19
Payer: COMMERCIAL

## 2025-08-19 DIAGNOSIS — Z79.899 OTHER LONG TERM (CURRENT) DRUG THERAPY: ICD-10-CM

## 2025-08-19 PROCEDURE — 80177 DRUG SCRN QUAN LEVETIRACETAM: CPT

## 2025-08-19 PROCEDURE — 36415 COLL VENOUS BLD VENIPUNCTURE: CPT

## 2025-08-20 LAB — LEVETIRACETAM SERPL-MCNC: 30.5 ÂΜG/ML (ref 10–40)
